# Patient Record
Sex: MALE | Race: WHITE | NOT HISPANIC OR LATINO
[De-identification: names, ages, dates, MRNs, and addresses within clinical notes are randomized per-mention and may not be internally consistent; named-entity substitution may affect disease eponyms.]

---

## 2019-04-12 PROBLEM — Z00.00 ENCOUNTER FOR PREVENTIVE HEALTH EXAMINATION: Status: ACTIVE | Noted: 2019-04-12

## 2019-04-15 ENCOUNTER — APPOINTMENT (OUTPATIENT)
Dept: COLORECTAL SURGERY | Facility: CLINIC | Age: 84
End: 2019-04-15
Payer: MEDICARE

## 2019-04-15 VITALS
RESPIRATION RATE: 14 BRPM | HEIGHT: 66 IN | BODY MASS INDEX: 22.5 KG/M2 | SYSTOLIC BLOOD PRESSURE: 120 MMHG | WEIGHT: 140 LBS | HEART RATE: 54 BPM | DIASTOLIC BLOOD PRESSURE: 66 MMHG

## 2019-04-15 DIAGNOSIS — Z86.79 PERSONAL HISTORY OF OTHER DISEASES OF THE CIRCULATORY SYSTEM: ICD-10-CM

## 2019-04-15 DIAGNOSIS — Z82.0 FAMILY HISTORY OF EPILEPSY AND OTHER DISEASES OF THE NERVOUS SYSTEM: ICD-10-CM

## 2019-04-15 DIAGNOSIS — Z86.39 PERSONAL HISTORY OF OTHER ENDOCRINE, NUTRITIONAL AND METABOLIC DISEASE: ICD-10-CM

## 2019-04-15 PROCEDURE — 99204 OFFICE O/P NEW MOD 45 MIN: CPT

## 2019-04-15 RX ORDER — METOPROLOL SUCCINATE 25 MG/1
25 TABLET, EXTENDED RELEASE ORAL
Refills: 0 | Status: ACTIVE | COMMUNITY

## 2019-04-15 RX ORDER — CHOLECALCIFEROL (VITAMIN D3) 25 MCG
TABLET ORAL
Refills: 0 | Status: ACTIVE | COMMUNITY

## 2019-04-15 RX ORDER — ASPIRIN 81 MG
81 TABLET, DELAYED RELEASE (ENTERIC COATED) ORAL
Refills: 0 | Status: ACTIVE | COMMUNITY

## 2019-04-15 RX ORDER — LISINOPRIL 20 MG/1
20 TABLET ORAL
Refills: 0 | Status: ACTIVE | COMMUNITY

## 2019-04-15 RX ORDER — SIMVASTATIN 20 MG/1
20 TABLET, FILM COATED ORAL
Refills: 0 | Status: ACTIVE | COMMUNITY

## 2019-04-18 RX ORDER — NEOMYCIN SULFATE 500 MG/1
500 TABLET ORAL
Qty: 6 | Refills: 0 | Status: ACTIVE | COMMUNITY
Start: 2019-04-18 | End: 1900-01-01

## 2019-04-18 RX ORDER — METRONIDAZOLE 500 MG/1
500 TABLET ORAL
Qty: 3 | Refills: 0 | Status: ACTIVE | COMMUNITY
Start: 2019-04-18 | End: 1900-01-01

## 2019-04-18 NOTE — ASSESSMENT
[FreeTextEntry1] : 83-year-old white gentleman who presents today for consultation regarding management of a possible partial large bowel obstruction.\par \par Many years ago in the early 2000's the patient underwent a sigmoid resection for a malignant polyp. He did well until a CAT scan revealed some thickening of the bladder in 2018. He eventually was prepped for a colonoscopy but a narrowing was noted at his prior anastomosis. This was dilated and the procedure was aborted. One month later the patient had a repeat sigmoidoscopy and a thickened area was noted. Biopsies were negative. The bowel was described as "wide open". Patient was then treated for C.diff. In March of 2019 he underwent a laparoscopic appendectomy but reportedly only part of the appendix was removed. He then developed some weight loss and change in bowel habits.  A PET scan was done revealing supposedly metastatic disease in the lung and liver and possibly some activity in the colon. He was sent to gastroenterology.  A virtual colonoscopy was performed revealing a narrowing at the prior sigmoid anastomosis with proximal dilation. Patient was sent here for evaluation.\par \par Additionally, it should be noted that the patient's lost 25-30 pounds. He describes multiple small bowel movements.\par \par It seems as if the patient has a metastatic disease. There is no biopsy proven recurrence in the colon. I would prefer to send this patient to interventional gastroenterologist for consideration of colonoscopic stent placement. He already has metastatic disease. I would attempt to avoid an operation on his colon as this will not change his overall outcome. If he can be stented, he can be placed back in the hands of oncology for systemic chemotherapy for palliative purposes.\par \par For now, I would have an attempt at stent placement made.

## 2019-04-18 NOTE — REVIEW OF SYSTEMS
[Loss Of Hearing] : hearing loss [As Noted in HPI] : as noted in HPI [Negative] : Psychiatric [Chest Pain] : no chest pain [SOB on Exertion] : no shortness of breath during exertion [Easy Bleeding] : no tendency for easy bleeding [Easy Bruising] : no tendency for easy bruising

## 2019-04-18 NOTE — PHYSICAL EXAM
[Normal Heart Sounds] : normal heart sounds [No Edema] : No edema [Normal Rate and Rhythm] : normal rate and rhythm [Oriented to Person] : oriented to person [Alert] : alert [Oriented to Place] : oriented to place [Oriented to Time] : oriented to time [Calm] : calm [de-identified] : flat +BS NT/ND [de-identified] : +ROM [de-identified] : NC/AT [de-identified] : intact

## 2019-04-18 NOTE — HISTORY OF PRESENT ILLNESS
[FreeTextEntry1] : 82yo WM underwent colon resection in 2001 for malignant sigmoid polyp.  Pt with 3yr surveillance colonoscopies since 2001 (last 2016).\par \par 2017 CT scan for abdominal pain revealed bladder wall thickening/inflammatory changes.\par Oct 2018 adm to [place] x5days with abdominal pain/difficulty swallowing. Upper endoscopy/flex sig.  Dxd with colitis/gastritis.  Txd with ABX.  Reportedly undergoes dilation of SC anastomosis.  Patient claims colon perforation occurred.\par Nov 6 flex sig for dilation purposes (not necessary as anastomosis was widely patent).  Bx performed. Path benign.\par Nov 28 admitted with Cdiff.  CT scan revealed pancolitis\par March 2019 lap appendectomy performed at [facility].  Reportedly CT scan revealed hepatic and pulmonary lesions.  Advised to f/u with Oncology.  F/U with Dr Krause.  Virtual Colonoscopy performed.\par \par Pt has experienced @25lb wt loss, @20 frequent small BMs daily, denies N/V or rectal bleeding\par

## 2019-04-19 ENCOUNTER — OUTPATIENT (OUTPATIENT)
Dept: OUTPATIENT SERVICES | Facility: HOSPITAL | Age: 84
LOS: 1 days | End: 2019-04-19
Payer: MEDICARE

## 2019-04-19 VITALS
RESPIRATION RATE: 16 BRPM | WEIGHT: 139.99 LBS | DIASTOLIC BLOOD PRESSURE: 80 MMHG | TEMPERATURE: 98 F | SYSTOLIC BLOOD PRESSURE: 127 MMHG | OXYGEN SATURATION: 100 % | HEIGHT: 66 IN | HEART RATE: 64 BPM

## 2019-04-19 DIAGNOSIS — C18.9 MALIGNANT NEOPLASM OF COLON, UNSPECIFIED: ICD-10-CM

## 2019-04-19 DIAGNOSIS — Z91.89 OTHER SPECIFIED PERSONAL RISK FACTORS, NOT ELSEWHERE CLASSIFIED: ICD-10-CM

## 2019-04-19 DIAGNOSIS — Z95.5 PRESENCE OF CORONARY ANGIOPLASTY IMPLANT AND GRAFT: ICD-10-CM

## 2019-04-19 DIAGNOSIS — Z98.890 OTHER SPECIFIED POSTPROCEDURAL STATES: Chronic | ICD-10-CM

## 2019-04-19 DIAGNOSIS — Z29.9 ENCOUNTER FOR PROPHYLACTIC MEASURES, UNSPECIFIED: ICD-10-CM

## 2019-04-19 DIAGNOSIS — Z95.5 PRESENCE OF CORONARY ANGIOPLASTY IMPLANT AND GRAFT: Chronic | ICD-10-CM

## 2019-04-19 DIAGNOSIS — Z98.49 CATARACT EXTRACTION STATUS, UNSPECIFIED EYE: Chronic | ICD-10-CM

## 2019-04-19 DIAGNOSIS — Z90.49 ACQUIRED ABSENCE OF OTHER SPECIFIED PARTS OF DIGESTIVE TRACT: Chronic | ICD-10-CM

## 2019-04-19 DIAGNOSIS — Z01.818 ENCOUNTER FOR OTHER PREPROCEDURAL EXAMINATION: ICD-10-CM

## 2019-04-19 LAB
ANION GAP SERPL CALC-SCNC: 13 MMOL/L — SIGNIFICANT CHANGE UP (ref 5–17)
BLD GP AB SCN SERPL QL: NEGATIVE — SIGNIFICANT CHANGE UP
BUN SERPL-MCNC: 20 MG/DL — SIGNIFICANT CHANGE UP (ref 7–23)
CALCIUM SERPL-MCNC: 10 MG/DL — SIGNIFICANT CHANGE UP (ref 8.4–10.5)
CHLORIDE SERPL-SCNC: 102 MMOL/L — SIGNIFICANT CHANGE UP (ref 96–108)
CO2 SERPL-SCNC: 21 MMOL/L — LOW (ref 22–31)
CREAT SERPL-MCNC: 1.02 MG/DL — SIGNIFICANT CHANGE UP (ref 0.5–1.3)
GLUCOSE SERPL-MCNC: 94 MG/DL — SIGNIFICANT CHANGE UP (ref 70–99)
POTASSIUM SERPL-MCNC: 4.6 MMOL/L — SIGNIFICANT CHANGE UP (ref 3.5–5.3)
POTASSIUM SERPL-SCNC: 4.6 MMOL/L — SIGNIFICANT CHANGE UP (ref 3.5–5.3)
RH IG SCN BLD-IMP: POSITIVE — SIGNIFICANT CHANGE UP
SODIUM SERPL-SCNC: 136 MMOL/L — SIGNIFICANT CHANGE UP (ref 135–145)

## 2019-04-19 PROCEDURE — 86900 BLOOD TYPING SEROLOGIC ABO: CPT

## 2019-04-19 PROCEDURE — 86901 BLOOD TYPING SEROLOGIC RH(D): CPT

## 2019-04-19 PROCEDURE — 85027 COMPLETE CBC AUTOMATED: CPT

## 2019-04-19 PROCEDURE — 86850 RBC ANTIBODY SCREEN: CPT

## 2019-04-19 PROCEDURE — 80048 BASIC METABOLIC PNL TOTAL CA: CPT

## 2019-04-19 PROCEDURE — 83036 HEMOGLOBIN GLYCOSYLATED A1C: CPT

## 2019-04-19 PROCEDURE — G0463: CPT

## 2019-04-19 PROCEDURE — 87086 URINE CULTURE/COLONY COUNT: CPT

## 2019-04-19 RX ORDER — SODIUM CHLORIDE 9 MG/ML
3 INJECTION INTRAMUSCULAR; INTRAVENOUS; SUBCUTANEOUS EVERY 8 HOURS
Qty: 0 | Refills: 0 | Status: DISCONTINUED | OUTPATIENT
Start: 2019-04-23 | End: 2019-04-23

## 2019-04-19 RX ORDER — LIDOCAINE HCL 20 MG/ML
0.2 VIAL (ML) INJECTION ONCE
Qty: 0 | Refills: 0 | Status: DISCONTINUED | OUTPATIENT
Start: 2019-04-23 | End: 2019-04-23

## 2019-04-19 RX ORDER — CEFOTETAN DISODIUM 1 G
2 VIAL (EA) INJECTION ONCE
Qty: 0 | Refills: 0 | Status: COMPLETED | OUTPATIENT
Start: 2019-04-23 | End: 2019-04-23

## 2019-04-19 RX ORDER — CHLORHEXIDINE GLUCONATE 213 G/1000ML
1 SOLUTION TOPICAL DAILY
Qty: 0 | Refills: 0 | Status: DISCONTINUED | OUTPATIENT
Start: 2019-04-23 | End: 2019-04-23

## 2019-04-19 NOTE — H&P PST ADULT - HISTORY OF PRESENT ILLNESS
82 yo male with h/o HTN, HLD, CAD (x 1 NAI to CX 11/14/2014) and history of colon resection (2001) for malignant sigmoid polyp. Since 10/2018 pt c/o abdominal pain, change in bowel habits and unintentional 25 lb weight loss. In October 2018, pt underwent upper endoscopy/flexible sigmoidoscopy at [place] and dilation of SC anastomosis, during which he states his bowel was perforated. Pt underwent another flexible sigmoidoscopy 11/6/2018 with biopsy (pathology benign). On 11/28/19, pt was admitted again for C Diff. In March 2019, laparoscopic appendectomy was performed (per pt, only part of the appendix was able to be removed). Imaging revealed lesion in lung and liver. Virtual colonoscopy recealed narrowing at the prior sigmoid anastomosis with proximal dilation. Pt is scheduled for Open Anterior Resection, Possible Ostomy and Cystoscopy Insertion of Ureteral Catheters on 4/23/2019.

## 2019-04-19 NOTE — H&P PST ADULT - ASSESSMENT
1.	Malignant Neoplasm of Colon  2.	Stented Coronary Artery  3.	At Risk for Sleep Apnea  CAPRINI VTE 2.0 SCORE [CLOT updated 2019]    AGE RELATED RISK FACTORS                                                       MOBILITY RELATED FACTORS  [ ] Age 41-60 years                                            (1 Point)                    [ ] Bed rest                                                        (1 Point)  [ ] Age: 61-74 years                                           (2 Points)                  [ ] Plaster cast                                                   (2 Points)  [ x] Age= 75 years                                              (3 Points)                    [ ] Bed bound for more than 72 hours                 (2 Points)    DISEASE RELATED RISK FACTORS                                               GENDER SPECIFIC FACTORS  [ ] Edema in the lower extremities                       (1 Point)              [ ] Pregnancy                                                     (1 Point)  [ ] Varicose veins                                               (1 Point)                     [ ] Post-partum < 6 weeks                                   (1 Point)             [ ] BMI > 25 Kg/m2                                            (1 Point)                     [ ] Hormonal therapy  or oral contraception          (1 Point)                 [ ] Sepsis (in the previous month)                        (1 Point)               [ ] History of pregnancy complications                 (1 point)  [ ] Pneumonia or serious lung disease                                               [ ] Unexplained or recurrent                     (1 Point)           (in the previous month)                               (1 Point)  [ ] Abnormal pulmonary function test                     (1 Point)                 SURGERY RELATED RISK FACTORS  [ ] Acute myocardial infarction                              (1 Point)               [ ]  Section                                             (1 Point)  [ ] Congestive heart failure (in the previous month)  (1 Point)      [ ] Minor surgery                                                  (1 Point)   [ ] Inflammatory bowel disease                             (1 Point)               [ ] Arthroscopic surgery                                        (2 Points)  [ ] Central venous access                                      (2 Points)                [ x] General surgery lasting more than 45 minutes (2 points)  [x ] Malignancy- Present or previous                   (2 Points)                [ ] Elective arthroplasty                                         (5 points)    [ ] Stroke (in the previous month)                          (5 Points)                                                                                                                                                           HEMATOLOGY RELATED FACTORS                                                 TRAUMA RELATED RISK FACTORS  [ ] Prior episodes of VTE                                     (3 Points)                [ ] Fracture of the hip, pelvis, or leg                       (5 Points)  [ ] Positive family history for VTE                         (3 Points)             [ ] Acute spinal cord injury (in the previous month)  (5 Points)  [ ] Prothrombin 01732 A                                     (3 Points)               [ ] Paralysis  (less than 1 month)                             (5 Points)  [ ] Factor V Leiden                                             (3 Points)                  [ ] Multiple Trauma within 1 month                        (5 Points)  [ ] Lupus anticoagulants                                     (3 Points)                                                           [ ] Anticardiolipin antibodies                               (3 Points)                                                       [ ] High homocysteine in the blood                      (3 Points)                                             [ ] Other congenital or acquired thrombophilia      (3 Points)                                                [ ] Heparin induced thrombocytopenia                  (3 Points)                                     Total Score [     7     ]

## 2019-04-19 NOTE — H&P PST ADULT - NSICDXPASTMEDICALHX_GEN_ALL_CORE_FT
PAST MEDICAL HISTORY:  Aortic stenosis     CAD (coronary artery disease)     Cancer of colon 2001 - malignant sigmoid polyp    HLD (hyperlipidemia)     Hypertension     Other specified diseases of appendix appendiceal lesion

## 2019-04-19 NOTE — H&P PST ADULT - NSICDXPASTSURGICALHX_GEN_ALL_CORE_FT
PAST SURGICAL HISTORY:  History of appendectomy 3/2019 - per pt, a portion of the appendix was not removed    History of colon resection 2001 - malignant sigmoid polyp    History of prostate surgery Greenlight laser of prostate    S/P cataract surgery bilateral    Stented coronary artery 11/14/2014 - NAI to CX

## 2019-04-19 NOTE — H&P PST ADULT - NSICDXPROBLEM_GEN_ALL_CORE_FT
PROBLEM DIAGNOSES  Problem: Malignant neoplasm of colon, unspecified  Assessment and Plan: Open Anterior Resection  Possible Ostomy  Cystoscopy Insertion of Ureteral Catheters  Stat FS on admission    Problem: Stented coronary artery  Assessment and Plan: Pt will continue aspirin to OR for stent protection    Problem: At risk for sleep apnea  Assessment and Plan: RAMILA Precautions  OR Booking notified    Problem: Need for prophylactic measure  Assessment and Plan: The Caprini score indicates that this patient is at high risk for a VTE event (score 6 or greater). Surgical patients in this group will benefit from both pharmacologic prophylaxis and intermittent compression devices.  The surgical team will determine the balance between VTE risk and bleeding risk, and other clinical considerations

## 2019-04-19 NOTE — H&P PST ADULT - NSANTHOSAYNRD_GEN_A_CORE
No. RAMILA screening performed.  STOP BANG Legend: 0-2 = LOW Risk; 3-4 = INTERMEDIATE Risk; 5-8 = HIGH Risk

## 2019-04-20 PROBLEM — C18.9 MALIGNANT NEOPLASM OF COLON, UNSPECIFIED: Chronic | Status: ACTIVE | Noted: 2019-04-19

## 2019-04-20 PROBLEM — K38.8 OTHER SPECIFIED DISEASES OF APPENDIX: Chronic | Status: ACTIVE | Noted: 2019-04-19

## 2019-04-20 LAB
CULTURE RESULTS: NO GROWTH — SIGNIFICANT CHANGE UP
HBA1C BLD-MCNC: 5.7 % — HIGH (ref 4–5.6)
HCT VFR BLD CALC: 42.9 % — SIGNIFICANT CHANGE UP (ref 39–50)
HGB BLD-MCNC: 13.4 G/DL — SIGNIFICANT CHANGE UP (ref 13–17)
MCHC RBC-ENTMCNC: 29.3 PG — SIGNIFICANT CHANGE UP (ref 27–34)
MCHC RBC-ENTMCNC: 31.2 GM/DL — LOW (ref 32–36)
MCV RBC AUTO: 93.9 FL — SIGNIFICANT CHANGE UP (ref 80–100)
PLATELET # BLD AUTO: 239 K/UL — SIGNIFICANT CHANGE UP (ref 150–400)
RBC # BLD: 4.57 M/UL — SIGNIFICANT CHANGE UP (ref 4.2–5.8)
RBC # FLD: 13.2 % — SIGNIFICANT CHANGE UP (ref 10.3–14.5)
SPECIMEN SOURCE: SIGNIFICANT CHANGE UP
WBC # BLD: 7.4 K/UL — SIGNIFICANT CHANGE UP (ref 3.8–10.5)
WBC # FLD AUTO: 7.4 K/UL — SIGNIFICANT CHANGE UP (ref 3.8–10.5)

## 2019-04-22 ENCOUNTER — TRANSCRIPTION ENCOUNTER (OUTPATIENT)
Age: 84
End: 2019-04-22

## 2019-04-23 ENCOUNTER — APPOINTMENT (OUTPATIENT)
Dept: COLORECTAL SURGERY | Facility: HOSPITAL | Age: 84
End: 2019-04-23

## 2019-04-23 ENCOUNTER — INPATIENT (INPATIENT)
Facility: HOSPITAL | Age: 84
LOS: 5 days | Discharge: HOME | DRG: 330 | End: 2019-04-29
Attending: SURGERY | Admitting: SURGERY
Payer: MEDICARE

## 2019-04-23 VITALS
RESPIRATION RATE: 18 BRPM | SYSTOLIC BLOOD PRESSURE: 154 MMHG | HEART RATE: 65 BPM | OXYGEN SATURATION: 100 % | TEMPERATURE: 98 F | WEIGHT: 81.13 LBS | HEIGHT: 66 IN | DIASTOLIC BLOOD PRESSURE: 79 MMHG

## 2019-04-23 DIAGNOSIS — I10 ESSENTIAL (PRIMARY) HYPERTENSION: ICD-10-CM

## 2019-04-23 DIAGNOSIS — Z98.890 OTHER SPECIFIED POSTPROCEDURAL STATES: Chronic | ICD-10-CM

## 2019-04-23 DIAGNOSIS — Z90.49 ACQUIRED ABSENCE OF OTHER SPECIFIED PARTS OF DIGESTIVE TRACT: Chronic | ICD-10-CM

## 2019-04-23 DIAGNOSIS — C18.9 MALIGNANT NEOPLASM OF COLON, UNSPECIFIED: ICD-10-CM

## 2019-04-23 DIAGNOSIS — Z01.818 ENCOUNTER FOR OTHER PREPROCEDURAL EXAMINATION: ICD-10-CM

## 2019-04-23 DIAGNOSIS — Z98.49 CATARACT EXTRACTION STATUS, UNSPECIFIED EYE: Chronic | ICD-10-CM

## 2019-04-23 DIAGNOSIS — I25.10 ATHEROSCLEROTIC HEART DISEASE OF NATIVE CORONARY ARTERY WITHOUT ANGINA PECTORIS: ICD-10-CM

## 2019-04-23 DIAGNOSIS — E78.5 HYPERLIPIDEMIA, UNSPECIFIED: ICD-10-CM

## 2019-04-23 DIAGNOSIS — Z95.5 PRESENCE OF CORONARY ANGIOPLASTY IMPLANT AND GRAFT: Chronic | ICD-10-CM

## 2019-04-23 LAB
GAS PNL BLDA: SIGNIFICANT CHANGE UP
GAS PNL BLDA: SIGNIFICANT CHANGE UP
GLUCOSE BLDC GLUCOMTR-MCNC: 118 MG/DL — HIGH (ref 70–99)
RH IG SCN BLD-IMP: POSITIVE — SIGNIFICANT CHANGE UP

## 2019-04-23 PROCEDURE — 44310 ILEOSTOMY/JEJUNOSTOMY: CPT

## 2019-04-23 PROCEDURE — 44310 ILEOSTOMY/JEJUNOSTOMY: CPT | Mod: 80

## 2019-04-23 RX ORDER — ACETAMINOPHEN 500 MG
650 TABLET ORAL ONCE
Qty: 0 | Refills: 0 | Status: COMPLETED | OUTPATIENT
Start: 2019-04-23 | End: 2019-04-24

## 2019-04-23 RX ORDER — ENOXAPARIN SODIUM 100 MG/ML
40 INJECTION SUBCUTANEOUS DAILY
Qty: 0 | Refills: 0 | Status: DISCONTINUED | OUTPATIENT
Start: 2019-04-23 | End: 2019-04-25

## 2019-04-23 RX ORDER — ACETAMINOPHEN 500 MG
650 TABLET ORAL ONCE
Qty: 0 | Refills: 0 | Status: COMPLETED | OUTPATIENT
Start: 2019-04-23 | End: 2019-04-23

## 2019-04-23 RX ORDER — L.ACIDOPH/B.ANIMALIS/B.LONGUM 15B CELL
1 CAPSULE ORAL
Qty: 0 | Refills: 0 | COMMUNITY

## 2019-04-23 RX ORDER — ACETAMINOPHEN 500 MG
1000 TABLET ORAL ONCE
Qty: 0 | Refills: 0 | Status: DISCONTINUED | OUTPATIENT
Start: 2019-04-23 | End: 2019-04-23

## 2019-04-23 RX ORDER — ASPIRIN/CALCIUM CARB/MAGNESIUM 324 MG
1 TABLET ORAL
Qty: 0 | Refills: 0 | COMMUNITY

## 2019-04-23 RX ORDER — ACETAMINOPHEN 500 MG
650 TABLET ORAL ONCE
Qty: 0 | Refills: 0 | Status: DISCONTINUED | OUTPATIENT
Start: 2019-04-23 | End: 2019-04-24

## 2019-04-23 RX ORDER — OXYCODONE HYDROCHLORIDE 5 MG/1
5 TABLET ORAL EVERY 4 HOURS
Qty: 0 | Refills: 0 | Status: DISCONTINUED | OUTPATIENT
Start: 2019-04-23 | End: 2019-04-23

## 2019-04-23 RX ORDER — HYDROMORPHONE HYDROCHLORIDE 2 MG/ML
0.25 INJECTION INTRAMUSCULAR; INTRAVENOUS; SUBCUTANEOUS
Qty: 0 | Refills: 0 | Status: DISCONTINUED | OUTPATIENT
Start: 2019-04-23 | End: 2019-04-23

## 2019-04-23 RX ORDER — KETOROLAC TROMETHAMINE 30 MG/ML
15 SYRINGE (ML) INJECTION EVERY 6 HOURS
Qty: 0 | Refills: 0 | Status: DISCONTINUED | OUTPATIENT
Start: 2019-04-23 | End: 2019-04-23

## 2019-04-23 RX ORDER — HYDROMORPHONE HYDROCHLORIDE 2 MG/ML
0.25 INJECTION INTRAMUSCULAR; INTRAVENOUS; SUBCUTANEOUS
Qty: 0 | Refills: 0 | Status: DISCONTINUED | OUTPATIENT
Start: 2019-04-23 | End: 2019-04-24

## 2019-04-23 RX ORDER — SIMVASTATIN 20 MG/1
1 TABLET, FILM COATED ORAL
Qty: 0 | Refills: 0 | COMMUNITY

## 2019-04-23 RX ORDER — KETOROLAC TROMETHAMINE 30 MG/ML
7.5 SYRINGE (ML) INJECTION EVERY 6 HOURS
Qty: 0 | Refills: 0 | Status: DISCONTINUED | OUTPATIENT
Start: 2019-04-23 | End: 2019-04-24

## 2019-04-23 RX ORDER — HYDROMORPHONE HYDROCHLORIDE 2 MG/ML
0.5 INJECTION INTRAMUSCULAR; INTRAVENOUS; SUBCUTANEOUS
Qty: 0 | Refills: 0 | Status: DISCONTINUED | OUTPATIENT
Start: 2019-04-23 | End: 2019-04-24

## 2019-04-23 RX ORDER — KETOROLAC TROMETHAMINE 30 MG/ML
7.5 SYRINGE (ML) INJECTION EVERY 6 HOURS
Qty: 0 | Refills: 0 | Status: DISCONTINUED | OUTPATIENT
Start: 2019-04-23 | End: 2019-04-23

## 2019-04-23 RX ORDER — MAGNESIUM CHLORIDE
500 CRYSTALS MISCELLANEOUS
Qty: 0 | Refills: 0 | COMMUNITY

## 2019-04-23 RX ORDER — DOXEPIN HCL 100 MG
10 CAPSULE ORAL AT BEDTIME
Qty: 0 | Refills: 0 | Status: DISCONTINUED | OUTPATIENT
Start: 2019-04-23 | End: 2019-04-25

## 2019-04-23 RX ORDER — DEXTROSE MONOHYDRATE, SODIUM CHLORIDE, AND POTASSIUM CHLORIDE 50; .745; 4.5 G/1000ML; G/1000ML; G/1000ML
1000 INJECTION, SOLUTION INTRAVENOUS
Qty: 0 | Refills: 0 | Status: DISCONTINUED | OUTPATIENT
Start: 2019-04-23 | End: 2019-04-24

## 2019-04-23 RX ORDER — CHOLECALCIFEROL (VITAMIN D3) 125 MCG
1 CAPSULE ORAL
Qty: 0 | Refills: 0 | COMMUNITY

## 2019-04-23 RX ORDER — ASPIRIN/CALCIUM CARB/MAGNESIUM 324 MG
81 TABLET ORAL DAILY
Qty: 0 | Refills: 0 | Status: DISCONTINUED | OUTPATIENT
Start: 2019-04-24 | End: 2019-04-25

## 2019-04-23 RX ORDER — METOPROLOL TARTRATE 50 MG
5 TABLET ORAL EVERY 6 HOURS
Qty: 0 | Refills: 0 | Status: DISCONTINUED | OUTPATIENT
Start: 2019-04-23 | End: 2019-04-24

## 2019-04-23 RX ORDER — LISINOPRIL 2.5 MG/1
1 TABLET ORAL
Qty: 0 | Refills: 0 | COMMUNITY

## 2019-04-23 RX ADMIN — Medication 10 MILLIGRAM(S): at 22:02

## 2019-04-23 RX ADMIN — Medication 260 MILLIGRAM(S): at 18:49

## 2019-04-23 RX ADMIN — HYDROMORPHONE HYDROCHLORIDE 0.25 MILLIGRAM(S): 2 INJECTION INTRAMUSCULAR; INTRAVENOUS; SUBCUTANEOUS at 14:25

## 2019-04-23 RX ADMIN — HYDROMORPHONE HYDROCHLORIDE 0.25 MILLIGRAM(S): 2 INJECTION INTRAMUSCULAR; INTRAVENOUS; SUBCUTANEOUS at 14:45

## 2019-04-23 RX ADMIN — ENOXAPARIN SODIUM 40 MILLIGRAM(S): 100 INJECTION SUBCUTANEOUS at 21:22

## 2019-04-23 RX ADMIN — HYDROMORPHONE HYDROCHLORIDE 0.25 MILLIGRAM(S): 2 INJECTION INTRAMUSCULAR; INTRAVENOUS; SUBCUTANEOUS at 21:36

## 2019-04-23 RX ADMIN — HYDROMORPHONE HYDROCHLORIDE 0.25 MILLIGRAM(S): 2 INJECTION INTRAMUSCULAR; INTRAVENOUS; SUBCUTANEOUS at 14:15

## 2019-04-23 RX ADMIN — HYDROMORPHONE HYDROCHLORIDE 0.25 MILLIGRAM(S): 2 INJECTION INTRAMUSCULAR; INTRAVENOUS; SUBCUTANEOUS at 17:05

## 2019-04-23 RX ADMIN — Medication 5 MILLIGRAM(S): at 17:36

## 2019-04-23 RX ADMIN — HYDROMORPHONE HYDROCHLORIDE 0.25 MILLIGRAM(S): 2 INJECTION INTRAMUSCULAR; INTRAVENOUS; SUBCUTANEOUS at 17:15

## 2019-04-23 RX ADMIN — Medication 7.5 MILLIGRAM(S): at 19:17

## 2019-04-23 RX ADMIN — DEXTROSE MONOHYDRATE, SODIUM CHLORIDE, AND POTASSIUM CHLORIDE 50 MILLILITER(S): 50; .745; 4.5 INJECTION, SOLUTION INTRAVENOUS at 15:00

## 2019-04-23 RX ADMIN — Medication 650 MILLIGRAM(S): at 19:17

## 2019-04-23 RX ADMIN — HYDROMORPHONE HYDROCHLORIDE 0.25 MILLIGRAM(S): 2 INJECTION INTRAMUSCULAR; INTRAVENOUS; SUBCUTANEOUS at 15:45

## 2019-04-23 RX ADMIN — Medication 7.5 MILLIGRAM(S): at 18:51

## 2019-04-23 RX ADMIN — HYDROMORPHONE HYDROCHLORIDE 0.25 MILLIGRAM(S): 2 INJECTION INTRAMUSCULAR; INTRAVENOUS; SUBCUTANEOUS at 21:21

## 2019-04-23 NOTE — BRIEF OPERATIVE NOTE - NSICDXBRIEFPROCEDURE_GEN_ALL_CORE_FT
PROCEDURES:  Sigmoidoscopy 23-Apr-2019 14:59:16  Eusebio Barber  Exploratory laparotomy 23-Apr-2019 14:59:03  Eusebio Barber  Ileostomy, loop, open 23-Apr-2019 14:58:53  Eusebio Barber

## 2019-04-23 NOTE — PROGRESS NOTE ADULT - SUBJECTIVE AND OBJECTIVE BOX
Subjective: Patient is a 83y old  Male who presents with a chief complaint of "I have a blockage in my colon."pt with hx ca colon many years ago colon perforation several months ago along with partial appendectomy now seen by me yesterday found to have complete obstruction at sigmoid level  with liver mets   infection probably neoplasm here for surgery today also with gerd hyperlipidemia hypertension  insomnia   PAST MEDICAL & SURGICAL HISTORY:  Aortic stenosis  Other specified diseases of appendix: appendiceal lesion  Cancer of colon: 2001 - malignant sigmoid polyp  CAD (coronary artery disease)  HLD (hyperlipidemia)  Hypertension  S/P cataract surgery: bilateral  History of prostate surgery: Greenlight laser of prostate  History of colon resection: 2001 - malignant sigmoid polyp  History of appendectomy: 3/2019 - per pt, a portion of the appendix was not removed  Stented coronary artery: 11/14/2014 - NAI to CX      Allergies    No Known Allergies    Intolerances    oxycodone (Other)  OxyContin (Other)      MEDICATIONS  (STANDING):  cefoTEtan  IVPB 2 Gram(s) IV Intermittent once    MEDICATIONS  (PRN):      CONSTITUTIONAL: No fever,  50lbweight loss, or fatigue  EYES: No eye pain, visual disturbances, or discharge  ENMT:  No difficulty hearing, tinnitus, vertigo; No sinus or throat pain  NECK: No pain or stiffness  BREASTS: No pain, masses, or nipple discharge  RESPIRATORY: No cough, wheezing, chills or hemoptysis; No shortness of breath  CARDIOVASCULAR: No chest pain, palpitations, dizziness, or leg swelling  GASTROINTESTINAL: No abdominal or epigastric pain. No nausea, vomiting, or hematemesis; No diarrhea or constipation. No melena or hematochezia.  GENITOURINARY: No dysuria, frequency, hematuria, or incontinence  NEUROLOGICAL: No headaches, memory loss, loss of strength, numbness, or tremors  SKIN: No itching, burning, rashes, or lesions   LYMPH NODES: No enlarged glands  ENDOCRINE: No heat or cold intolerance; No hair loss  MUSCULOSKELETAL: No joint pain or swelling; No muscle, back, or extremity pain  PSYCHIATRIC: No depression, anxiety, mood swings, or difficulty sleeping  HEME/LYMPH: No easy bruising, or bleeding gums  ALLERY AND IMMUNOLOGIC: No hives or eczema      PHYSICAL EXAM:    GENERAL: Comfortable, no acute distress   HEAD:  Normocephalic, atraumatic  EYES: EOMI, PERRLA  HEENT: Moist mucous membranes  NECK: Supple, No JVD  NERVOUS SYSTEM:  Alert & Oriented X3, Motor Strength 5/5 B/L upper and lower extremities  CHEST/LUNG: Clear to auscultation bilaterally  HEART: Regular rate and rhythm  ABDOMEN: Soft, Nontender, distended, Bowel sounds present  GENITOURINARY: Voiding, no palpable bladder  EXTREMITIES:   No clubbing, cyanosis, or edema  MUSCULOSKELTAL- No muscle tenderness, no joint tenderness  SKIN-no rash            Vital Signs Last 24 Hrs  T(C): 36.8 (23 Apr 2019 07:12), Max: 36.8 (23 Apr 2019 07:12)  T(F): 98.2 (23 Apr 2019 07:12), Max: 98.2 (23 Apr 2019 07:12)  HR: 65 (23 Apr 2019 07:12) (65 - 65)  BP: 154/79 (23 Apr 2019 07:12) (154/79 - 154/79)  BP(mean): --  RR: 18 (23 Apr 2019 07:12) (18 - 18)  SpO2: 100% (23 Apr 2019 07:12) (100% - 100%)      LABS:                      EKG:    Imaging Studies: obstructing mass on virtual colonoscopy and colonscopy       Impression / Plan:

## 2019-04-23 NOTE — PROGRESS NOTE ADULT - ASSESSMENT
pt with high bop hyperlipidemia with colon cancer with mets and obstructing lesion sigmoid colon for surgery today

## 2019-04-23 NOTE — BRIEF OPERATIVE NOTE - NSICDXBRIEFPREOP_GEN_ALL_CORE_FT
PRE-OP DIAGNOSIS:  Metastatic colon cancer to liver 23-Apr-2019 15:01:59  Eusebio Barber  Large bowel obstruction 23-Apr-2019 15:01:39  Eusebio Barber  Recurrent carcinoma of colon 23-Apr-2019 15:01:25  Eusebio Barber

## 2019-04-23 NOTE — BRIEF OPERATIVE NOTE - NSICDXBRIEFPOSTOP_GEN_ALL_CORE_FT
POST-OP DIAGNOSIS:  Large bowel obstruction 23-Apr-2019 15:02:43  Eusebio Barber  Metastatic colon cancer to liver 23-Apr-2019 15:02:31  Eusebio Barber  Recurrent carcinoma of colon 23-Apr-2019 15:02:23  Eusebio Barber

## 2019-04-23 NOTE — CHART NOTE - NSCHARTNOTEFT_GEN_A_CORE
SURGERY POST-OP NOTE:    S: Patient underwent and tolerated procedure without issue and sent to PACU. Patient denies chest pain, shortness of breath, nausea, vomiting, lightheadedness, or dizziness. Pain was well controlled.      O:  T(C): 36.4 (04-23-19 @ 17:15), Max: 36.8 (04-23-19 @ 14:00)  HR: 97 (04-23-19 @ 18:00) (70 - 105)  BP: 108/58 (04-23-19 @ 18:00) (96/59 - 138/68)  RR: 16 (04-23-19 @ 18:00) (12 - 18)  SpO2: 97% (04-23-19 @ 18:00) (92% - 100%)           Gen: NAD, resting in bed, alert and responding appropriately  Resp: Non-labored respirations  Abd: Soft, nontender, nondistended, midline incision dressed w/ some serosang strikethrough otherwise d/i, ostomy viable no gas/stool  : UCath and Clayton in place      Assessment/Plan:  83y Male now POD#0 s/p Sigmoidoscopy w/ rectal tube placement, exploratory laparotomy, loop ileostomy recovering appropriately    - Pain control  - NPO / IVF  - Clayton / UCath  - DVT ppx: Lovenox, OOBA  - Incentive spirometry    Dispo: floors    JERROD Batista, PGY-1  [facility]  [id number] with any questions

## 2019-04-23 NOTE — BRIEF OPERATIVE NOTE - OPERATION/FINDINGS
Bilateral u-caths placed pre-operatively.  Laparotomy incision made, small bowel and cecum - which were adherent in pelvis - were freed with combination of sharp and blunt dissection as well as TA stapler. Disease was palpated in rectum and was noted to extend posteriorly and to the right, involving the cecum which was adherent to anterior abdominal wall. We were able to palpate both of the U-caths and the ureters were well away from our dissection. The diseased part of rectum was noted to be closely and extensively adherent to the bladder.  At this point we made the decision to consult GI to attempt endoscopic stent placement across the rectal mass. This was accomplished with copious amount of stool passing through. We matured a loop ileostomy on the left side of patient because of the adherent cecum on the right. Fascia closed with figure of eight stitches. Skin stapled. Ileostomy brooked.

## 2019-04-24 LAB
ANION GAP SERPL CALC-SCNC: 7 MMOL/L — SIGNIFICANT CHANGE UP (ref 5–17)
ANION GAP SERPL CALC-SCNC: 9 MMOL/L — SIGNIFICANT CHANGE UP (ref 5–17)
BUN SERPL-MCNC: 24 MG/DL — HIGH (ref 7–23)
BUN SERPL-MCNC: 29 MG/DL — HIGH (ref 7–23)
CALCIUM SERPL-MCNC: 8.5 MG/DL — SIGNIFICANT CHANGE UP (ref 8.4–10.5)
CALCIUM SERPL-MCNC: 8.6 MG/DL — SIGNIFICANT CHANGE UP (ref 8.4–10.5)
CHLORIDE SERPL-SCNC: 101 MMOL/L — SIGNIFICANT CHANGE UP (ref 96–108)
CHLORIDE SERPL-SCNC: 102 MMOL/L — SIGNIFICANT CHANGE UP (ref 96–108)
CO2 SERPL-SCNC: 21 MMOL/L — LOW (ref 22–31)
CO2 SERPL-SCNC: 24 MMOL/L — SIGNIFICANT CHANGE UP (ref 22–31)
CREAT SERPL-MCNC: 1.25 MG/DL — SIGNIFICANT CHANGE UP (ref 0.5–1.3)
CREAT SERPL-MCNC: 1.43 MG/DL — HIGH (ref 0.5–1.3)
GLUCOSE SERPL-MCNC: 147 MG/DL — HIGH (ref 70–99)
GLUCOSE SERPL-MCNC: 153 MG/DL — HIGH (ref 70–99)
HCT VFR BLD CALC: 36 % — LOW (ref 39–50)
HCT VFR BLD CALC: 37.6 % — LOW (ref 39–50)
HGB BLD-MCNC: 12 G/DL — LOW (ref 13–17)
HGB BLD-MCNC: 12.7 G/DL — LOW (ref 13–17)
MAGNESIUM SERPL-MCNC: 2.2 MG/DL — SIGNIFICANT CHANGE UP (ref 1.6–2.6)
MAGNESIUM SERPL-MCNC: 2.3 MG/DL — SIGNIFICANT CHANGE UP (ref 1.6–2.6)
MCHC RBC-ENTMCNC: 29.3 PG — SIGNIFICANT CHANGE UP (ref 27–34)
MCHC RBC-ENTMCNC: 31.9 GM/DL — LOW (ref 32–36)
MCHC RBC-ENTMCNC: 32.6 PG — SIGNIFICANT CHANGE UP (ref 27–34)
MCHC RBC-ENTMCNC: 35.1 GM/DL — SIGNIFICANT CHANGE UP (ref 32–36)
MCV RBC AUTO: 91.9 FL — SIGNIFICANT CHANGE UP (ref 80–100)
MCV RBC AUTO: 92.8 FL — SIGNIFICANT CHANGE UP (ref 80–100)
PHOSPHATE SERPL-MCNC: 2.1 MG/DL — LOW (ref 2.5–4.5)
PHOSPHATE SERPL-MCNC: 2.6 MG/DL — SIGNIFICANT CHANGE UP (ref 2.5–4.5)
PLATELET # BLD AUTO: 222 K/UL — SIGNIFICANT CHANGE UP (ref 150–400)
PLATELET # BLD AUTO: 226 K/UL — SIGNIFICANT CHANGE UP (ref 150–400)
POTASSIUM SERPL-MCNC: 4.6 MMOL/L — SIGNIFICANT CHANGE UP (ref 3.5–5.3)
POTASSIUM SERPL-MCNC: 5.2 MMOL/L — SIGNIFICANT CHANGE UP (ref 3.5–5.3)
POTASSIUM SERPL-SCNC: 4.6 MMOL/L — SIGNIFICANT CHANGE UP (ref 3.5–5.3)
POTASSIUM SERPL-SCNC: 5.2 MMOL/L — SIGNIFICANT CHANGE UP (ref 3.5–5.3)
RBC # BLD: 3.88 M/UL — LOW (ref 4.2–5.8)
RBC # BLD: 4.09 M/UL — LOW (ref 4.2–5.8)
RBC # FLD: 12.8 % — SIGNIFICANT CHANGE UP (ref 10.3–14.5)
RBC # FLD: 13.5 % — SIGNIFICANT CHANGE UP (ref 10.3–14.5)
SODIUM SERPL-SCNC: 131 MMOL/L — LOW (ref 135–145)
SODIUM SERPL-SCNC: 133 MMOL/L — LOW (ref 135–145)
WBC # BLD: 11.65 K/UL — HIGH (ref 3.8–10.5)
WBC # BLD: 19.2 K/UL — HIGH (ref 3.8–10.5)
WBC # FLD AUTO: 11.65 K/UL — HIGH (ref 3.8–10.5)
WBC # FLD AUTO: 19.2 K/UL — HIGH (ref 3.8–10.5)

## 2019-04-24 PROCEDURE — 99222 1ST HOSP IP/OBS MODERATE 55: CPT | Mod: GC

## 2019-04-24 PROCEDURE — 93010 ELECTROCARDIOGRAM REPORT: CPT

## 2019-04-24 RX ORDER — ONDANSETRON 8 MG/1
4 TABLET, FILM COATED ORAL ONCE
Qty: 0 | Refills: 0 | Status: COMPLETED | OUTPATIENT
Start: 2019-04-24 | End: 2019-04-24

## 2019-04-24 RX ORDER — ENOXAPARIN SODIUM 100 MG/ML
40 INJECTION SUBCUTANEOUS
Qty: 1200 | Refills: 0 | OUTPATIENT
Start: 2019-04-24 | End: 2019-05-23

## 2019-04-24 RX ORDER — BENZOCAINE AND MENTHOL 5; 1 G/100ML; G/100ML
1 LIQUID ORAL ONCE
Qty: 0 | Refills: 0 | Status: COMPLETED | OUTPATIENT
Start: 2019-04-24 | End: 2019-04-24

## 2019-04-24 RX ORDER — ACETAMINOPHEN 500 MG
650 TABLET ORAL EVERY 6 HOURS
Qty: 0 | Refills: 0 | Status: DISCONTINUED | OUTPATIENT
Start: 2019-04-24 | End: 2019-04-29

## 2019-04-24 RX ORDER — TAMSULOSIN HYDROCHLORIDE 0.4 MG/1
0.4 CAPSULE ORAL AT BEDTIME
Qty: 0 | Refills: 0 | Status: DISCONTINUED | OUTPATIENT
Start: 2019-04-24 | End: 2019-04-29

## 2019-04-24 RX ORDER — SODIUM CHLORIDE 9 MG/ML
1000 INJECTION, SOLUTION INTRAVENOUS
Qty: 0 | Refills: 0 | Status: DISCONTINUED | OUTPATIENT
Start: 2019-04-24 | End: 2019-04-26

## 2019-04-24 RX ORDER — HYDROMORPHONE HYDROCHLORIDE 2 MG/ML
2 INJECTION INTRAMUSCULAR; INTRAVENOUS; SUBCUTANEOUS EVERY 4 HOURS
Qty: 0 | Refills: 0 | Status: DISCONTINUED | OUTPATIENT
Start: 2019-04-24 | End: 2019-04-24

## 2019-04-24 RX ORDER — TETRACAINE/BENZOCAINE/BUTAMBEN 2%-14%-2%
1 OINTMENT (GRAM) TOPICAL THREE TIMES A DAY
Qty: 0 | Refills: 0 | Status: DISCONTINUED | OUTPATIENT
Start: 2019-04-24 | End: 2019-04-26

## 2019-04-24 RX ORDER — CHOLECALCIFEROL (VITAMIN D3) 125 MCG
1000 CAPSULE ORAL DAILY
Qty: 0 | Refills: 0 | Status: DISCONTINUED | OUTPATIENT
Start: 2019-04-24 | End: 2019-04-29

## 2019-04-24 RX ORDER — SODIUM CHLORIDE 9 MG/ML
500 INJECTION, SOLUTION INTRAVENOUS ONCE
Qty: 0 | Refills: 0 | Status: COMPLETED | OUTPATIENT
Start: 2019-04-24 | End: 2019-04-24

## 2019-04-24 RX ORDER — PANTOPRAZOLE SODIUM 20 MG/1
40 TABLET, DELAYED RELEASE ORAL DAILY
Qty: 0 | Refills: 0 | Status: DISCONTINUED | OUTPATIENT
Start: 2019-04-24 | End: 2019-04-25

## 2019-04-24 RX ORDER — HYDROMORPHONE HYDROCHLORIDE 2 MG/ML
1 INJECTION INTRAMUSCULAR; INTRAVENOUS; SUBCUTANEOUS EVERY 4 HOURS
Qty: 0 | Refills: 0 | Status: DISCONTINUED | OUTPATIENT
Start: 2019-04-24 | End: 2019-04-24

## 2019-04-24 RX ORDER — BENZOCAINE AND MENTHOL 5; 1 G/100ML; G/100ML
1 LIQUID ORAL EVERY 6 HOURS
Qty: 0 | Refills: 0 | Status: DISCONTINUED | OUTPATIENT
Start: 2019-04-24 | End: 2019-04-29

## 2019-04-24 RX ORDER — SODIUM CHLORIDE 9 MG/ML
1000 INJECTION, SOLUTION INTRAVENOUS
Qty: 0 | Refills: 0 | Status: DISCONTINUED | OUTPATIENT
Start: 2019-04-24 | End: 2019-04-24

## 2019-04-24 RX ORDER — HYDROMORPHONE HYDROCHLORIDE 2 MG/ML
4 INJECTION INTRAMUSCULAR; INTRAVENOUS; SUBCUTANEOUS EVERY 4 HOURS
Qty: 0 | Refills: 0 | Status: DISCONTINUED | OUTPATIENT
Start: 2019-04-24 | End: 2019-04-29

## 2019-04-24 RX ORDER — METOPROLOL TARTRATE 50 MG
25 TABLET ORAL DAILY
Qty: 0 | Refills: 0 | Status: DISCONTINUED | OUTPATIENT
Start: 2019-04-24 | End: 2019-04-25

## 2019-04-24 RX ORDER — HYDROMORPHONE HYDROCHLORIDE 2 MG/ML
2 INJECTION INTRAMUSCULAR; INTRAVENOUS; SUBCUTANEOUS EVERY 4 HOURS
Qty: 0 | Refills: 0 | Status: DISCONTINUED | OUTPATIENT
Start: 2019-04-24 | End: 2019-04-29

## 2019-04-24 RX ORDER — SIMVASTATIN 20 MG/1
20 TABLET, FILM COATED ORAL AT BEDTIME
Qty: 0 | Refills: 0 | Status: DISCONTINUED | OUTPATIENT
Start: 2019-04-24 | End: 2019-04-29

## 2019-04-24 RX ADMIN — BENZOCAINE AND MENTHOL 1 LOZENGE: 5; 1 LIQUID ORAL at 17:18

## 2019-04-24 RX ADMIN — DEXTROSE MONOHYDRATE, SODIUM CHLORIDE, AND POTASSIUM CHLORIDE 50 MILLILITER(S): 50; .745; 4.5 INJECTION, SOLUTION INTRAVENOUS at 06:10

## 2019-04-24 RX ADMIN — Medication 1 SPRAY(S): at 12:37

## 2019-04-24 RX ADMIN — ONDANSETRON 4 MILLIGRAM(S): 8 TABLET, FILM COATED ORAL at 08:10

## 2019-04-24 RX ADMIN — ENOXAPARIN SODIUM 40 MILLIGRAM(S): 100 INJECTION SUBCUTANEOUS at 12:36

## 2019-04-24 RX ADMIN — HYDROMORPHONE HYDROCHLORIDE 0.5 MILLIGRAM(S): 2 INJECTION INTRAMUSCULAR; INTRAVENOUS; SUBCUTANEOUS at 01:18

## 2019-04-24 RX ADMIN — Medication 7.5 MILLIGRAM(S): at 12:37

## 2019-04-24 RX ADMIN — Medication 7.5 MILLIGRAM(S): at 00:30

## 2019-04-24 RX ADMIN — BENZOCAINE AND MENTHOL 1 LOZENGE: 5; 1 LIQUID ORAL at 08:10

## 2019-04-24 RX ADMIN — Medication 650 MILLIGRAM(S): at 17:40

## 2019-04-24 RX ADMIN — Medication 650 MILLIGRAM(S): at 06:30

## 2019-04-24 RX ADMIN — Medication 7.5 MILLIGRAM(S): at 06:11

## 2019-04-24 RX ADMIN — Medication 10 MILLIGRAM(S): at 21:39

## 2019-04-24 RX ADMIN — SODIUM CHLORIDE 250 MILLILITER(S): 9 INJECTION, SOLUTION INTRAVENOUS at 01:02

## 2019-04-24 RX ADMIN — Medication 81 MILLIGRAM(S): at 12:36

## 2019-04-24 RX ADMIN — Medication 62.5 MILLIMOLE(S): at 09:43

## 2019-04-24 RX ADMIN — Medication 7.5 MILLIGRAM(S): at 06:30

## 2019-04-24 RX ADMIN — DEXTROSE MONOHYDRATE, SODIUM CHLORIDE, AND POTASSIUM CHLORIDE 50 MILLILITER(S): 50; .745; 4.5 INJECTION, SOLUTION INTRAVENOUS at 09:43

## 2019-04-24 RX ADMIN — SODIUM CHLORIDE 75 MILLILITER(S): 9 INJECTION, SOLUTION INTRAVENOUS at 21:40

## 2019-04-24 RX ADMIN — Medication 7.5 MILLIGRAM(S): at 00:11

## 2019-04-24 RX ADMIN — Medication 650 MILLIGRAM(S): at 00:30

## 2019-04-24 RX ADMIN — Medication 260 MILLIGRAM(S): at 00:12

## 2019-04-24 RX ADMIN — Medication 260 MILLIGRAM(S): at 06:11

## 2019-04-24 RX ADMIN — BENZOCAINE AND MENTHOL 1 LOZENGE: 5; 1 LIQUID ORAL at 10:40

## 2019-04-24 RX ADMIN — SIMVASTATIN 20 MILLIGRAM(S): 20 TABLET, FILM COATED ORAL at 21:39

## 2019-04-24 RX ADMIN — Medication 650 MILLIGRAM(S): at 17:10

## 2019-04-24 RX ADMIN — Medication 1000 UNIT(S): at 14:42

## 2019-04-24 RX ADMIN — PANTOPRAZOLE SODIUM 40 MILLIGRAM(S): 20 TABLET, DELAYED RELEASE ORAL at 12:37

## 2019-04-24 RX ADMIN — HYDROMORPHONE HYDROCHLORIDE 0.5 MILLIGRAM(S): 2 INJECTION INTRAMUSCULAR; INTRAVENOUS; SUBCUTANEOUS at 01:03

## 2019-04-24 RX ADMIN — SODIUM CHLORIDE 75 MILLILITER(S): 9 INJECTION, SOLUTION INTRAVENOUS at 16:46

## 2019-04-24 RX ADMIN — TAMSULOSIN HYDROCHLORIDE 0.4 MILLIGRAM(S): 0.4 CAPSULE ORAL at 21:39

## 2019-04-24 RX ADMIN — Medication 7.5 MILLIGRAM(S): at 13:07

## 2019-04-24 NOTE — PROVIDER CONTACT NOTE (OTHER) - ACTION/TREATMENT ORDERED:
MD Hansen notified and aware. Ordered for additional 500cc LR bolus to increase urine output. Will administer additional pain medication to help relieve pain. Will continue to monitor.

## 2019-04-24 NOTE — PROVIDER CONTACT NOTE (OTHER) - ASSESSMENT
Clayton remains in place w/remaining L ucath. Urine remains blood tinged. Pt c/o discomfort @ lower abd (pt claims to be bladder discomfort). Bladder scan shows 0cc of residual urine.

## 2019-04-24 NOTE — PROGRESS NOTE ADULT - ASSESSMENT
pt with high bop hyperlipidemia with colon cancer with mets and obstructing lesion sigmoid colon day 1 post op illeostomy and stent placement

## 2019-04-24 NOTE — PROGRESS NOTE ADULT - ASSESSMENT
83M hx colon cancer s/p resection (2001) who had a recurrence and is now POD#1 s/p b/l ucath placement, ex-lap, intraoperative rectal stent placement by GI, loop ileostomy creation (unresectable mass) (4/23)    - Pain control  - NPO / IVF  - d/c UCath, Clayton, f/u TOV  - Resume ASA  - DVT ppx: Lovenox, OOBA    Dispo: anil Batista, PGY-1  [facility]  [id number] with any questions 83M hx colon cancer s/p resection (2001) who had a recurrence and is now POD#1 s/p b/l ucath placement, ex-lap, intraoperative rectal stent placement by GI, loop ileostomy creation (unresectable mass) (4/23)    - Pain control  - NPO / IVF  - d/c UCath, keep Clayton until Friday 4/26  - Resume ASA  - DVT ppx: Lovenox, OOBA    Dispo: anil Batista, PGY-1  [facility]  [id number] with any questions

## 2019-04-24 NOTE — PROGRESS NOTE ADULT - SUBJECTIVE AND OBJECTIVE BOX
Subjective: Patient is a 83y old  Male who presents with a chief complaint of "I have a blockage in my colon."pt with hx ca colon many years ago colon perforation several months ago along with partial appendectomy now found to have complete obstruction at sigmoid level  with liver mets  Day 1 post op illeostomy stent placement     PSH  Aortic stenosis  Other specified diseases of appendix: appendiceal lesion  Cancer of colon: 2001 - malignant sigmoid polyp  CAD (coronary artery disease)  HLD (hyperlipidemia)  Hypertension  S/P cataract surgery: bilateral  History of prostate surgery: Greenlight laser of prostate  History of colon resection: 2001 - malignant sigmoid polyp  History of appendectomy: 3/2019 - per pt, a portion of the appendix was not removed  Stented coronary artery: 11/14/2014 - NAI to CX      Allergies    No Known Allergies    Intolerances    oxycodone (Other)  OxyContin (Other)      MEDICATIONS  (STANDING):  cefoTEtan  IVPB 2 Gram(s) IV Intermittent once    MEDICATIONS  (PRN):      CONSTITUTIONAL: No fever,  50lbweight loss, fatigue  EYES: No eye pain, visual disturbances, or discharge  ENMT:  No difficulty hearing, tinnitus, vertigo; No sinus or throat pain  NECK: No pain or stiffness  BREASTS: No pain, masses, or nipple discharge  RESPIRATORY: No cough, wheezing, chills or hemoptysis; No shortness of breath  CARDIOVASCULAR: No chest pain, palpitations, dizziness, or leg swelling  GASTROINTESTINAL:abdominal pain distension no bms GENITOURINARY: No dysuria, frequency, hematuria, or incontinence  NEUROLOGICAL: No headaches, memory loss, loss of strength, numbness, or tremors  SKIN: No itching, burning, rashes, or lesions   LYMPH NODES: No enlarged glands  ENDOCRINE: No heat or cold intolerance; No hair loss  MUSCULOSKELETAL: No joint pain or swelling; No muscle, back, or extremity pain  PSYCHIATRIC: No depression, anxiety, mood swings, or difficulty sleeping  HEME/LYMPH: No easy bruising, or bleeding gums  ALLERY AND IMMUNOLOGIC: No hives or eczema      PHYSICAL EXAM:    GENERAL: Comfortable, no acute distress   HEAD:  Normocephalic, atraumatic  EYES: EOMI, PERRLA  HEENT: Moist mucous membranes  NECK: Supple, No JVD  NERVOUS SYSTEM:  Alert & Oriented X3, Motor Strength 5/5 B/L upper and lower extremities  CHEST/LUNG: Clear to auscultation bilaterally  HEART: Regular rate and rhythm  ABDOMEN distended dressed illeostomy  GENITOURINARY: indwelling tony   EXTREMITIES:   No clubbing, cyanosis, or edema  MUSCULOSKELTAL- No muscle tenderness, no joint tenderness  SKIN-no rash            Vital Signs Last 24 Hrs  T(C): 36.8 (23 Apr 2019 07:12), Max: 36.8 (23 Apr 2019 07:12)  T(F): 98.2 (23 Apr 2019 07:12), Max: 98.2 (23 Apr 2019 07:12)  HR: 65 (23 Apr 2019 07:12) (65 - 65)  BP: 154/79 (23 Apr 2019 07:12) (154/79 - 154/79)  BP(mean): --  RR: 18 (23 Apr 2019 07:12) (18 - 18)  SpO2: 100% (23 Apr 2019 07:12) (100% - 100%)      LABS:  pending this am                     EKG:    Imaging Studies: obstructing mass on virtual colonoscopy and colonscopy       Impression / Plan:

## 2019-04-24 NOTE — PROGRESS NOTE ADULT - SUBJECTIVE AND OBJECTIVE BOX
COLORECTAL SURGERY DAILY PROGRESS NOTE:    Interval:  No acute events overnight.    Subjective:  Patient seen and examined. Reports pain is well controlled. Denies N/V. No ostomy function.    Vital Signs Last 24 Hrs  T(C): 36.7 (24 Apr 2019 05:17), Max: 37.1 (23 Apr 2019 19:11)  T(F): 98 (24 Apr 2019 05:17), Max: 98.8 (23 Apr 2019 19:11)  HR: 106 (24 Apr 2019 05:17) (65 - 106)  BP: 106/68 (24 Apr 2019 05:17) (96/59 - 154/79)  BP(mean): 80 (23 Apr 2019 18:45) (71 - 95)  RR: 18 (24 Apr 2019 05:17) (12 - 18)  SpO2: 95% (24 Apr 2019 05:17) (92% - 100%)    Exam:  Gen: NAD, resting in bed, alert and responding appropriately  Resp: Airway patent, non-labored respirations  Abd: Soft, ND, NT, incision dressed d/i, ostomy viable w/out fxn  Ext: WWP      I&O's Detail    23 Apr 2019 07:01  -  24 Apr 2019 05:20  --------------------------------------------------------  IN:    dextrose 5% + sodium chloride 0.45% with potassium chloride 20 mEq/L: 250 mL    Lactated Ringers IV Bolus: 500 mL    Solution: 130 mL  Total IN: 880 mL    OUT:    Ileostomy: 15 mL    Indwelling Catheter - Urethral: 385 mL  Total OUT: 400 mL    Total NET: 480 mL      Daily Height in cm: 167.64 (23 Apr 2019 07:12)    Daily     MEDICATIONS  (STANDING):  acetaminophen  IVPB .. 650 milliGRAM(s) IV Intermittent once  acetaminophen  IVPB .. 650 milliGRAM(s) IV Intermittent once  acetaminophen  IVPB .. 650 milliGRAM(s) IV Intermittent once  aspirin  chewable 81 milliGRAM(s) Oral daily  dextrose 5% + sodium chloride 0.45% with potassium chloride 20 mEq/L 1000 milliLiter(s) (50 mL/Hr) IV Continuous <Continuous>  doxepin 10 milliGRAM(s) Oral at bedtime  enoxaparin Injectable 40 milliGRAM(s) SubCutaneous daily  ketorolac   Injectable 7.5 milliGRAM(s) IV Push every 6 hours  metoprolol tartrate Injectable 5 milliGRAM(s) IV Push every 6 hours    MEDICATIONS  (PRN):  HYDROmorphone  Injectable 0.5 milliGRAM(s) IV Push every 3 hours PRN Severe Pain (7 - 10)  HYDROmorphone  Injectable 0.25 milliGRAM(s) IV Push every 3 hours PRN Moderate Pain (4 - 6) COLORECTAL SURGERY DAILY PROGRESS NOTE:    Interval:  No acute events overnight.    Subjective:  Patient seen and examined. Reports pain is well controlled. Denies N/V. No ostomy function.    Vital Signs Last 24 Hrs  T(C): 36.7 (24 Apr 2019 05:17), Max: 37.1 (23 Apr 2019 19:11)  T(F): 98 (24 Apr 2019 05:17), Max: 98.8 (23 Apr 2019 19:11)  HR: 106 (24 Apr 2019 05:17) (65 - 106)  BP: 106/68 (24 Apr 2019 05:17) (96/59 - 154/79)  BP(mean): 80 (23 Apr 2019 18:45) (71 - 95)  RR: 18 (24 Apr 2019 05:17) (12 - 18)  SpO2: 95% (24 Apr 2019 05:17) (92% - 100%)    Exam:  Gen: NAD, resting in bed, alert and responding appropriately  Resp: Airway patent, non-labored respirations  Abd: Soft, ND, NT, incision dressed d/i, ostomy viable w/ minimal output  Ext: WWP      I&O's Detail    23 Apr 2019 07:01  -  24 Apr 2019 05:20  --------------------------------------------------------  IN:    dextrose 5% + sodium chloride 0.45% with potassium chloride 20 mEq/L: 250 mL    Lactated Ringers IV Bolus: 500 mL    Solution: 130 mL  Total IN: 880 mL    OUT:    Ileostomy: 15 mL    Indwelling Catheter - Urethral: 385 mL  Total OUT: 400 mL    Total NET: 480 mL      Daily Height in cm: 167.64 (23 Apr 2019 07:12)    Daily     MEDICATIONS  (STANDING):  acetaminophen  IVPB .. 650 milliGRAM(s) IV Intermittent once  acetaminophen  IVPB .. 650 milliGRAM(s) IV Intermittent once  acetaminophen  IVPB .. 650 milliGRAM(s) IV Intermittent once  aspirin  chewable 81 milliGRAM(s) Oral daily  dextrose 5% + sodium chloride 0.45% with potassium chloride 20 mEq/L 1000 milliLiter(s) (50 mL/Hr) IV Continuous <Continuous>  doxepin 10 milliGRAM(s) Oral at bedtime  enoxaparin Injectable 40 milliGRAM(s) SubCutaneous daily  ketorolac   Injectable 7.5 milliGRAM(s) IV Push every 6 hours  metoprolol tartrate Injectable 5 milliGRAM(s) IV Push every 6 hours    MEDICATIONS  (PRN):  HYDROmorphone  Injectable 0.5 milliGRAM(s) IV Push every 3 hours PRN Severe Pain (7 - 10)  HYDROmorphone  Injectable 0.25 milliGRAM(s) IV Push every 3 hours PRN Moderate Pain (4 - 6)

## 2019-04-24 NOTE — PROVIDER CONTACT NOTE (OTHER) - ACTION/TREATMENT ORDERED:
MD notified and aware. Will pass on to day team and reevaluate. Will continue to monitor and maintain safety.

## 2019-04-25 LAB
ANION GAP SERPL CALC-SCNC: 11 MMOL/L — SIGNIFICANT CHANGE UP (ref 5–17)
ANION GAP SERPL CALC-SCNC: 11 MMOL/L — SIGNIFICANT CHANGE UP (ref 5–17)
ANION GAP SERPL CALC-SCNC: 9 MMOL/L — SIGNIFICANT CHANGE UP (ref 5–17)
ANION GAP SERPL CALC-SCNC: 9 MMOL/L — SIGNIFICANT CHANGE UP (ref 5–17)
BUN SERPL-MCNC: 20 MG/DL — SIGNIFICANT CHANGE UP (ref 7–23)
BUN SERPL-MCNC: 23 MG/DL — SIGNIFICANT CHANGE UP (ref 7–23)
CALCIUM SERPL-MCNC: 8.8 MG/DL — SIGNIFICANT CHANGE UP (ref 8.4–10.5)
CALCIUM SERPL-MCNC: 8.8 MG/DL — SIGNIFICANT CHANGE UP (ref 8.4–10.5)
CALCIUM SERPL-MCNC: 8.9 MG/DL — SIGNIFICANT CHANGE UP (ref 8.4–10.5)
CALCIUM SERPL-MCNC: 8.9 MG/DL — SIGNIFICANT CHANGE UP (ref 8.4–10.5)
CHLORIDE SERPL-SCNC: 103 MMOL/L — SIGNIFICANT CHANGE UP (ref 96–108)
CHLORIDE SERPL-SCNC: 104 MMOL/L — SIGNIFICANT CHANGE UP (ref 96–108)
CK MB BLD-MCNC: 1 % — SIGNIFICANT CHANGE UP (ref 0–3.5)
CK MB CFR SERPL CALC: 1.5 NG/ML — SIGNIFICANT CHANGE UP (ref 0–6.7)
CK SERPL-CCNC: 148 U/L — SIGNIFICANT CHANGE UP (ref 30–200)
CO2 SERPL-SCNC: 19 MMOL/L — LOW (ref 22–31)
CO2 SERPL-SCNC: 20 MMOL/L — LOW (ref 22–31)
CO2 SERPL-SCNC: 21 MMOL/L — LOW (ref 22–31)
CO2 SERPL-SCNC: 22 MMOL/L — SIGNIFICANT CHANGE UP (ref 22–31)
CREAT SERPL-MCNC: 0.95 MG/DL — SIGNIFICANT CHANGE UP (ref 0.5–1.3)
CREAT SERPL-MCNC: 0.97 MG/DL — SIGNIFICANT CHANGE UP (ref 0.5–1.3)
CREAT SERPL-MCNC: 1.01 MG/DL — SIGNIFICANT CHANGE UP (ref 0.5–1.3)
CREAT SERPL-MCNC: 1.09 MG/DL — SIGNIFICANT CHANGE UP (ref 0.5–1.3)
GLUCOSE SERPL-MCNC: 147 MG/DL — HIGH (ref 70–99)
GLUCOSE SERPL-MCNC: 150 MG/DL — HIGH (ref 70–99)
GLUCOSE SERPL-MCNC: 157 MG/DL — HIGH (ref 70–99)
GLUCOSE SERPL-MCNC: 188 MG/DL — HIGH (ref 70–99)
HCT VFR BLD CALC: 32.2 % — LOW (ref 39–50)
HCT VFR BLD CALC: 32.4 % — LOW (ref 39–50)
HCT VFR BLD CALC: 33.7 % — LOW (ref 39–50)
HGB BLD-MCNC: 10.4 G/DL — LOW (ref 13–17)
HGB BLD-MCNC: 10.7 G/DL — LOW (ref 13–17)
HGB BLD-MCNC: 10.7 G/DL — LOW (ref 13–17)
LACTATE SERPL-SCNC: 1.8 MMOL/L — SIGNIFICANT CHANGE UP (ref 0.7–2)
MAGNESIUM SERPL-MCNC: 2.1 MG/DL — SIGNIFICANT CHANGE UP (ref 1.6–2.6)
MAGNESIUM SERPL-MCNC: 2.1 MG/DL — SIGNIFICANT CHANGE UP (ref 1.6–2.6)
MAGNESIUM SERPL-MCNC: 2.2 MG/DL — SIGNIFICANT CHANGE UP (ref 1.6–2.6)
MCHC RBC-ENTMCNC: 29.6 PG — SIGNIFICANT CHANGE UP (ref 27–34)
MCHC RBC-ENTMCNC: 29.7 PG — SIGNIFICANT CHANGE UP (ref 27–34)
MCHC RBC-ENTMCNC: 31.1 PG — SIGNIFICANT CHANGE UP (ref 27–34)
MCHC RBC-ENTMCNC: 31.8 GM/DL — LOW (ref 32–36)
MCHC RBC-ENTMCNC: 32 GM/DL — SIGNIFICANT CHANGE UP (ref 32–36)
MCHC RBC-ENTMCNC: 33.3 GM/DL — SIGNIFICANT CHANGE UP (ref 32–36)
MCV RBC AUTO: 92.6 FL — SIGNIFICANT CHANGE UP (ref 80–100)
MCV RBC AUTO: 93.3 FL — SIGNIFICANT CHANGE UP (ref 80–100)
MCV RBC AUTO: 93.6 FL — SIGNIFICANT CHANGE UP (ref 80–100)
PHOSPHATE SERPL-MCNC: 1.4 MG/DL — LOW (ref 2.5–4.5)
PHOSPHATE SERPL-MCNC: 2.8 MG/DL — SIGNIFICANT CHANGE UP (ref 2.5–4.5)
PHOSPHATE SERPL-MCNC: 2.9 MG/DL — SIGNIFICANT CHANGE UP (ref 2.5–4.5)
PLATELET # BLD AUTO: 192 K/UL — SIGNIFICANT CHANGE UP (ref 150–400)
PLATELET # BLD AUTO: 205 K/UL — SIGNIFICANT CHANGE UP (ref 150–400)
PLATELET # BLD AUTO: 205 K/UL — SIGNIFICANT CHANGE UP (ref 150–400)
POTASSIUM SERPL-MCNC: 4.4 MMOL/L — SIGNIFICANT CHANGE UP (ref 3.5–5.3)
POTASSIUM SERPL-MCNC: 4.4 MMOL/L — SIGNIFICANT CHANGE UP (ref 3.5–5.3)
POTASSIUM SERPL-MCNC: 4.6 MMOL/L — SIGNIFICANT CHANGE UP (ref 3.5–5.3)
POTASSIUM SERPL-MCNC: 4.6 MMOL/L — SIGNIFICANT CHANGE UP (ref 3.5–5.3)
POTASSIUM SERPL-SCNC: 4.4 MMOL/L — SIGNIFICANT CHANGE UP (ref 3.5–5.3)
POTASSIUM SERPL-SCNC: 4.4 MMOL/L — SIGNIFICANT CHANGE UP (ref 3.5–5.3)
POTASSIUM SERPL-SCNC: 4.6 MMOL/L — SIGNIFICANT CHANGE UP (ref 3.5–5.3)
POTASSIUM SERPL-SCNC: 4.6 MMOL/L — SIGNIFICANT CHANGE UP (ref 3.5–5.3)
RBC # BLD: 3.45 M/UL — LOW (ref 4.2–5.8)
RBC # BLD: 3.5 M/UL — LOW (ref 4.2–5.8)
RBC # BLD: 3.6 M/UL — LOW (ref 4.2–5.8)
RBC # FLD: 12.8 % — SIGNIFICANT CHANGE UP (ref 10.3–14.5)
RBC # FLD: 13 % — SIGNIFICANT CHANGE UP (ref 10.3–14.5)
RBC # FLD: 13.6 % — SIGNIFICANT CHANGE UP (ref 10.3–14.5)
SODIUM SERPL-SCNC: 132 MMOL/L — LOW (ref 135–145)
SODIUM SERPL-SCNC: 135 MMOL/L — SIGNIFICANT CHANGE UP (ref 135–145)
TROPONIN T, HIGH SENSITIVITY RESULT: 27 NG/L — SIGNIFICANT CHANGE UP (ref 0–51)
WBC # BLD: 13.2 K/UL — HIGH (ref 3.8–10.5)
WBC # BLD: 16.7 K/UL — HIGH (ref 3.8–10.5)
WBC # BLD: 17.82 K/UL — HIGH (ref 3.8–10.5)
WBC # FLD AUTO: 13.2 K/UL — HIGH (ref 3.8–10.5)
WBC # FLD AUTO: 16.7 K/UL — HIGH (ref 3.8–10.5)
WBC # FLD AUTO: 17.82 K/UL — HIGH (ref 3.8–10.5)

## 2019-04-25 PROCEDURE — 93010 ELECTROCARDIOGRAM REPORT: CPT

## 2019-04-25 PROCEDURE — 99232 SBSQ HOSP IP/OBS MODERATE 35: CPT | Mod: GC

## 2019-04-25 PROCEDURE — 71045 X-RAY EXAM CHEST 1 VIEW: CPT | Mod: 26

## 2019-04-25 RX ORDER — DOXEPIN HCL 100 MG
20 CAPSULE ORAL AT BEDTIME
Qty: 0 | Refills: 0 | Status: DISCONTINUED | OUTPATIENT
Start: 2019-04-25 | End: 2019-04-29

## 2019-04-25 RX ORDER — ENOXAPARIN SODIUM 100 MG/ML
40 INJECTION SUBCUTANEOUS DAILY
Qty: 0 | Refills: 0 | Status: DISCONTINUED | OUTPATIENT
Start: 2019-04-25 | End: 2019-04-29

## 2019-04-25 RX ORDER — SODIUM CHLORIDE 9 MG/ML
1000 INJECTION, SOLUTION INTRAVENOUS ONCE
Qty: 0 | Refills: 0 | Status: COMPLETED | OUTPATIENT
Start: 2019-04-25 | End: 2019-04-25

## 2019-04-25 RX ORDER — PANTOPRAZOLE SODIUM 20 MG/1
40 TABLET, DELAYED RELEASE ORAL
Qty: 0 | Refills: 0 | Status: DISCONTINUED | OUTPATIENT
Start: 2019-04-25 | End: 2019-04-26

## 2019-04-25 RX ORDER — METOPROLOL TARTRATE 50 MG
5 TABLET ORAL ONCE
Qty: 0 | Refills: 0 | Status: COMPLETED | OUTPATIENT
Start: 2019-04-25 | End: 2019-04-25

## 2019-04-25 RX ORDER — METOPROLOL TARTRATE 50 MG
50 TABLET ORAL DAILY
Qty: 0 | Refills: 0 | Status: DISCONTINUED | OUTPATIENT
Start: 2019-04-25 | End: 2019-04-29

## 2019-04-25 RX ORDER — SODIUM CHLORIDE 9 MG/ML
1000 INJECTION INTRAMUSCULAR; INTRAVENOUS; SUBCUTANEOUS ONCE
Qty: 0 | Refills: 0 | Status: COMPLETED | OUTPATIENT
Start: 2019-04-25 | End: 2019-04-25

## 2019-04-25 RX ADMIN — Medication 20 MILLIGRAM(S): at 21:18

## 2019-04-25 RX ADMIN — Medication 650 MILLIGRAM(S): at 19:00

## 2019-04-25 RX ADMIN — Medication 5 MILLIGRAM(S): at 09:21

## 2019-04-25 RX ADMIN — Medication 1000 UNIT(S): at 12:11

## 2019-04-25 RX ADMIN — Medication 650 MILLIGRAM(S): at 17:41

## 2019-04-25 RX ADMIN — Medication 62.5 MILLIMOLE(S): at 21:22

## 2019-04-25 RX ADMIN — Medication 650 MILLIGRAM(S): at 06:48

## 2019-04-25 RX ADMIN — SODIUM CHLORIDE 1000 MILLILITER(S): 9 INJECTION INTRAMUSCULAR; INTRAVENOUS; SUBCUTANEOUS at 06:21

## 2019-04-25 RX ADMIN — ENOXAPARIN SODIUM 40 MILLIGRAM(S): 100 INJECTION SUBCUTANEOUS at 12:10

## 2019-04-25 RX ADMIN — Medication 650 MILLIGRAM(S): at 12:11

## 2019-04-25 RX ADMIN — PANTOPRAZOLE SODIUM 40 MILLIGRAM(S): 20 TABLET, DELAYED RELEASE ORAL at 17:41

## 2019-04-25 RX ADMIN — Medication 650 MILLIGRAM(S): at 06:19

## 2019-04-25 RX ADMIN — Medication 650 MILLIGRAM(S): at 12:45

## 2019-04-25 RX ADMIN — SODIUM CHLORIDE 75 MILLILITER(S): 9 INJECTION, SOLUTION INTRAVENOUS at 11:00

## 2019-04-25 RX ADMIN — Medication 25 MILLIGRAM(S): at 06:19

## 2019-04-25 RX ADMIN — TAMSULOSIN HYDROCHLORIDE 0.4 MILLIGRAM(S): 0.4 CAPSULE ORAL at 21:18

## 2019-04-25 RX ADMIN — Medication 85 MILLIMOLE(S): at 07:17

## 2019-04-25 RX ADMIN — SIMVASTATIN 20 MILLIGRAM(S): 20 TABLET, FILM COATED ORAL at 21:18

## 2019-04-25 RX ADMIN — SODIUM CHLORIDE 1000 MILLILITER(S): 9 INJECTION, SOLUTION INTRAVENOUS at 11:00

## 2019-04-25 NOTE — PHYSICAL THERAPY INITIAL EVALUATION ADULT - LEVEL OF CONSCIOUSNESS, REHAB EVAL
PT assessment complete. Agree with triage note. Pt c/o abd pain since Sunday. No n/v/d. Pt last BM yesterday. Pt dx with flu. PT in gown. Educated on NPO status until cleared by MD. Pt is alert, active, age appropriate, and NAD. No needs. Will continue to monitor.     lethargic/somnolent

## 2019-04-25 NOTE — PHYSICAL THERAPY INITIAL EVALUATION ADULT - PERTINENT HX OF CURRENT PROBLEM, REHAB EVAL
83y old  Male who presents with a chief complaint of "I have a blockage in my colon."pt with hx ca colon many years ago colon perforation several months ago along with partial appendectomy now found to have complete obstruction at sigmoid level  with liver mets 83y old  Male who presents with a chief complaint of "I have a blockage in my colon."pt with hx ca colon many years ago colon perforation several months ago along with partial appendectomy now found to have complete obstruction at sigmoid level with liver mets

## 2019-04-25 NOTE — PROGRESS NOTE ADULT - SUBJECTIVE AND OBJECTIVE BOX
Subjective: Patient is a 83y old  Male who presents with a chief complaint of "I have a blockage in my colon."pt with hx ca colon many years ago colon perforation several months ago along with partial appendectomy now found to have complete obstruction at sigmoid level  with liver mets  Day 2 post op illeostomy stent placement     PSH  Aortic stenosis  Other specified diseases of appendix: appendiceal lesion  Cancer of colon: 2001 - malignant sigmoid polyp  CAD (coronary artery disease)  HLD (hyperlipidemia)  Hypertension  S/P cataract surgery: bilateral  History of prostate surgery: Greenlight laser of prostate  History of colon resection: 2001 - malignant sigmoid polyp  History of appendectomy: 3/2019 - per pt, a portion of the appendix was not removed  Stented coronary artery: 11/14/2014 - NAI to CX      Allergies    No Known Allergies    Intolerances    oxycodone (Other)  OxyContin (Other)      MEDICATIONS  (STANDING):  cefoTEtan  IVPB 2 Gram(s) IV Intermittent once    MEDICATIONS  (PRN):      CONSTITUTIONAL: No fever,  50lbweight loss, fatigue  EYES: No eye pain, visual disturbances, or discharge  ENMT:  No difficulty hearing, tinnitus, vertigo; No sinus or throat pain  NECK: No pain or stiffness  BREASTS: No pain, masses, or nipple discharge  RESPIRATORY: No cough, wheezing, chills or hemoptysis; No shortness of breath  CARDIOVASCULAR: No chest pain, palpitations, dizziness, or leg swelling  GASTROINTESTINAL:abdominal pain distension no bms GENITOURINARY: No dysuria, frequency, hematuria, or incontinence  NEUROLOGICAL: No headaches, memory loss, loss of strength, numbness, or tremors  SKIN: No itching, burning, rashes, or lesions   LYMPH NODES: No enlarged glands  ENDOCRINE: No heat or cold intolerance; No hair loss  MUSCULOSKELETAL: No joint pain or swelling; No muscle, back, or extremity pain  PSYCHIATRIC: No depression, anxiety, mood swings, or difficulty sleeping  HEME/LYMPH: No easy bruising, or bleeding gums  ALLERY AND IMMUNOLOGIC: No hives or eczema      PHYSICAL EXAM:    GENERAL: Comfortable, no acute distress   HEAD:  Normocephalic, atraumatic  EYES: EOMI, PERRLA  HEENT: Moist mucous membranes  NECK: Supple, No JVD  NERVOUS SYSTEM:  Alert & Oriented X3, Motor Strength 5/5 B/L upper and lower extremities  CHEST/LUNG: Clear to auscultation bilaterally  HEART: Regular rate and rhythm  ABDOMEN distended dressed illeostomy  stent with drainage   rectum GENITOURINARY: indwelling tony   EXTREMITIES:   No clubbing, cyanosis, or edema  MUSCULOSKELTAL- No muscle tenderness, no joint tenderness  SKIN-no rash  vs stable afebrile               Basic Metabolic Panel - STAT (04.24.19 @ 19:00)    Sodium, Serum: 131 mmol/L    Potassium, Serum: 4.6 mmol/L    Chloride, Serum: 101 mmol/L    Carbon Dioxide, Serum: 21 mmol/L    Anion Gap, Serum: 9 mmol/L    Blood Urea Nitrogen, Serum: 29 mg/dL    Creatinine, Serum: 1.25 mg/dL    Glucose, Serum: 153 mg/dL    Calcium, Total Serum: 8.6 mg/dL    eGFR if Non : 53: Interpretative comment  The units for eGFR are mL/min/1.73M2 (normalized body surface area). The  eGFR is calculated from a serum creatinine using the CKD-EPI equation.  Other variables required for calculation are race, age and sex. Among  patients with chronic kidney disease (CKD), the eGFR is useful in  determining the stage of disease according to KDOQI CKD classification.  All eGFR results are reported numerically with the following  interpretation.          GFR                    With                 Without     (ml/min/1.73 m2)    Kidney Damage       Kidney Damage        >= 90                    Stage 1                     Normal        60-89                    Stage 2                     Decreased GFR        30-59     Stage 3                     Stage 3        15-29                    Stage 4                     Stage 4        < 15                      Stage 5                     Stage 5  Each stage of CKD assumes that the associated GFR level has been in  effect for at least 3 months. Determination of stages one and two (with  eGFR > 59 ml/min/m2) requires estimation of kidney damage for at least 3  months as defined by structural or functional abnormalities.  Limitations: All estimates of GFR will be less accurate for patients at  extremes of muscle mass (including but not limited to frail elderly,  critically ill, or cancer patients), those with unusual diets, and those  with conditions associated with reduced secretion or extrarenal  elimination of creatinine. The eGFR equation is not recommended for use  in patients with unstable creatinine levels. mL/min/1.73M2    eGFR if African American: 61 mL/min/1.73M2    Complete Blood Count (04.24.19 @ 19:00)    WBC Count: 19.2 K/uL    RBC Count: 3.88 M/uL    Hemoglobin: 12.7 g/dL    Hematocrit: 36.0 %    Mean Cell Volume: 92.8 fl    Mean Cell Hemoglobin: 32.6 pg    Mean Cell Hemoglobin Conc: 35.1 gm/dL    Red Cell Distrib Width: 12.8 %    Platelet Count - Automated: 222 K/uL                    EKG:    Imaging Studies: obstructing mass on virtual colonoscopy and colonscopy       Impression / Plan:

## 2019-04-25 NOTE — PROGRESS NOTE ADULT - ASSESSMENT
83M hx colon cancer s/p resection (2001) who had a recurrence and is now POD#2 s/p b/l ucath placement, ex-lap, intraoperative rectal stent placement by GI, loop ileostomy creation (unresectable mass) (4/23)    - Pain control  - Monitor HR (EKG 4/24 sinus tachycardia)  - Clears, possibly advance to LRD, ASA  - Keep [place] until Friday 4/26  - DVT ppx: Lovenox, AMANDA    Dispo: anil Batista, PGY-1  [facility]  [id number] with any questions

## 2019-04-25 NOTE — PHYSICAL THERAPY INITIAL EVALUATION ADULT - DISCHARGE DISPOSITION, PT EVAL
home/no skilled PT needs/Home with no skilled PT needs. No AD recommendation. Supervision with mobility skills at this time (pt's spouse states she can provide). **Pt cleared for d/c home from PT perspective at this time.

## 2019-04-25 NOTE — PHYSICAL THERAPY INITIAL EVALUATION ADULT - GENERAL OBSERVATIONS, REHAB EVAL
Pt received seated in chair, (+) ileostomy to tony bag, tony, R UE IV, NAD. Pt's spouse at side. Pt lethargic, reports poor sleep in hospital, agreeable to PT eval.

## 2019-04-25 NOTE — PROGRESS NOTE ADULT - SUBJECTIVE AND OBJECTIVE BOX
Chief Complaint:  Patient is a 83y old  Male who presents with a chief complaint of colon ca stent illeostomy metast ca (2019 04:24)      Interval Events:   Patient c/o heartburn this morning. Relieved with Protonix yesterday. HR elevated, made NPO by surgical team. Patient with output from rectum +flatu +stool.     Hospital Medications:  acetaminophen   Tablet .. 650 milliGRAM(s) Oral every 6 hours  aspirin  chewable 81 milliGRAM(s) Oral daily  benzocaine 15 mG/menthol 3.6 mG Lozenge 1 Lozenge Oral every 6 hours PRN  cholecalciferol 1000 Unit(s) Oral daily  dextrose 5% + sodium chloride 0.9%. 1000 milliLiter(s) IV Continuous <Continuous>  doxepin 20 milliGRAM(s) Oral at bedtime  enoxaparin Injectable 40 milliGRAM(s) SubCutaneous daily  HYDROmorphone   Tablet 2 milliGRAM(s) Oral every 4 hours PRN  HYDROmorphone   Tablet 4 milliGRAM(s) Oral every 4 hours PRN  metoprolol succinate ER 25 milliGRAM(s) Oral daily  metoprolol tartrate Injectable 5 milliGRAM(s) IV Push once  pantoprazole  Injectable 40 milliGRAM(s) IV Push two times a day  simvastatin 20 milliGRAM(s) Oral at bedtime  tamsulosin 0.4 milliGRAM(s) Oral at bedtime  tetracaine/benzocaine/butamben Spray 1 Spray(s) Topical three times a day PRN        PHYSICAL EXAM:   Vital Signs:  Vital Signs Last 24 Hrs  T(C): 36.9 (2019 08:26), Max: 38.1 (2019 00:38)  T(F): 98.5 (2019 08:26), Max: 100.5 (2019 00:38)  HR: 134 (2019 08:26) (99 - 134)  BP: 114/67 (2019 08:26) (104/58 - 124/77)  BP(mean): --  RR: 18 (2019 08:26) (18 - 18)  SpO2: 95% (2019 08:26) (94% - 97%)  Daily     Daily Weight in k.3 (2019 09:11)    GENERAL:  Appears stated age,  no distress  HEENT:   sclera -anicteric  CHEST:   no increased effort, breath sounds clear  HEART:  Regular rhythm, S1, S2,   ABDOMEN:  Soft, non-tender, non-distended, normoactive bowel sounds, +ostomy    EXTEREMITIES:  no cyanosis,clubbing or edema  SKIN:  No rash/no jaundice   NEURO:  Alert, oriented    LABS:                        12.7   19.2  )-----------( 222      ( 2019 19:00 )             36.0     Mean Cell Volume: 92.8 fl (19 @ 19:00)        135  |  104  |  23  ----------------------------<  157<H>  4.6   |  22  |  1.09    Ca    8.8      2019 07:13  Phos  1.4       Mg     2.2                                         12.7   19.2  )-----------( 222      ( 2019 19:00 )             36.0                         12.0   11.65 )-----------( 226      ( 2019 08:45 )             37.6     Imaging:  no new abdominal imaging

## 2019-04-25 NOTE — PROGRESS NOTE ADULT - PROBLEM SELECTOR PLAN 1
day 2 post op colon resection illeostomy stent placement dvt prophylaxis surg protocol followed by gi for stent check next week

## 2019-04-25 NOTE — CHART NOTE - NSCHARTNOTEFT_GEN_A_CORE
spoke with Dr. Krause- advised him patients heart rate up to 130s today- missed lopressor several doses IV spoke with Dr. Krause per Dr. Fletcher regarding patient's heart rate- advised him rate up to 130s today- all sinus tachycardia- lopressor doses IV held for dosing parameter yesterday- received 25mg po metoprolol today and additional 5mg IV-  increase po to 50mg XL daily- he will have Dr. Parham cards see patient    Jammie Mendoza PA-C [id number]

## 2019-04-25 NOTE — PROGRESS NOTE ADULT - ASSESSMENT
pt with high bop hyperlipidemia with colon cancer with mets and obstructing lesion sigmoid colon day 2 post op illeostomy and stent placement  oob to chair

## 2019-04-25 NOTE — PROGRESS NOTE ADULT - ASSESSMENT
Impression:  1)Sigmoid obstruction- suspect secondary to recurrent colorectal malignancy, liver and lung mets are seen on imaging, s/p loop ileostomy with colonic stent placement.  2)Tachycardia with leukocytosis- concern for sepsis    Recommendations:  -monitor GI function through rectum and ileostomy  -IVF hydration, supportive care as per surgical team  -infectious workup   -will discuss with Dr. Cleary, potential repeat endoscopic evaluation next week     Please call with questions  Ena Lambert  GI Fellow  Pager: [phone] Impression:  1)Sigmoid obstruction- suspect secondary to recurrent colorectal malignancy, liver and lung mets are seen on imaging, s/p loop ileostomy with colonic stent placement.  2)Tachycardia with leukocytosis- concern for sepsis    Recommendations:  -monitor GI function through rectum and ileostomy  -IVF hydration, supportive care as per surgical team  -infectious workup       Please call with questions  Ena Lambert  GI Fellow  Pager: [phone]

## 2019-04-25 NOTE — PROGRESS NOTE ADULT - SUBJECTIVE AND OBJECTIVE BOX
COLORECTAL SURGERY DAILY PROGRESS NOTE:    Interval:  No acute events overnight.     Subjective:  Patient seen and examined. Reports pain is well controlled. Denies N/V. Tolerating clears, though not much intake. Ileostomy functioning, also having bowel movements from below.    Vital Signs Last 24 Hrs  T(C): 38.1 (2019 00:38), Max: 38.1 (2019 00:38)  T(F): 100.5 (2019 00:38), Max: 100.5 (2019 00:38)  HR: 125 (2019 00:38) (99 - 125)  BP: 109/64 (2019 00:38) (104/58 - 124/77)  RR: 18 (2019 00:38) (18 - 18)  SpO2: 94% (2019 00:38) (94% - 96%)    Exam:  Gen: NAD, resting in bed, alert and responding appropriately  Resp: Airway patent, non-labored respirations  Abd: Soft, ND, NT, incision dressed d/i, ostomy viable w/ fxn  Ext: WWP    I&O's Detail    2019 07:01  -  2019 07:00  --------------------------------------------------------  IN:    dextrose 5% + sodium chloride 0.45% with potassium chloride 20 mEq/L: 720 mL    Lactated Ringers IV Bolus: 500 mL    Solution: 195 mL  Total IN: 1415 mL    OUT:    Ileostomy: 15 mL    Indwelling Catheter - Urethral: 410 mL  Total OUT: 425 mL    Total NET: 990 mL      2019 07:01  -  2019 05:30  --------------------------------------------------------  IN:    dextrose 5% + lactated ringers.: 340 mL    dextrose 5% + sodium chloride 0.45% with potassium chloride 20 mEq/L: 425 mL    Oral Fluid: 720 mL  Total IN: 1485 mL    OUT:    Ileostomy: 485 mL    Indwelling Catheter - Urethral: 500 mL  Total OUT: 985 mL    Total NET: 500 mL    Daily Weight in k.3 (2019 09:11)    MEDICATIONS  (STANDING):  acetaminophen   Tablet .. 650 milliGRAM(s) Oral every 6 hours  aspirin  chewable 81 milliGRAM(s) Oral daily  cholecalciferol 1000 Unit(s) Oral daily  dextrose 5% + sodium chloride 0.9%. 1000 milliLiter(s) (75 mL/Hr) IV Continuous <Continuous>  doxepin 10 milliGRAM(s) Oral at bedtime  enoxaparin Injectable 40 milliGRAM(s) SubCutaneous daily  metoprolol succinate ER 25 milliGRAM(s) Oral daily  pantoprazole  Injectable 40 milliGRAM(s) IV Push daily  simvastatin 20 milliGRAM(s) Oral at bedtime  sodium chloride 0.9% Bolus 1000 milliLiter(s) IV Bolus once  tamsulosin 0.4 milliGRAM(s) Oral at bedtime    MEDICATIONS  (PRN):  benzocaine 15 mG/menthol 3.6 mG Lozenge 1 Lozenge Oral every 6 hours PRN Sore Throat  HYDROmorphone   Tablet 2 milliGRAM(s) Oral every 4 hours PRN Moderate Pain (4 - 6)  HYDROmorphone   Tablet 4 milliGRAM(s) Oral every 4 hours PRN Severe Pain (7 - 10)  tetracaine/benzocaine/butamben Spray 1 Spray(s) Topical three times a day PRN throat pain      LABS:                        12.7   19.2  )-----------( 222      ( 2019 19:00 )             36.0     -24    131<L>  |  101  |  29<H>  ----------------------------<  153<H>  4.6   |  21<L>  |  1.25    Ca    8.6      2019 19:00  Phos  2.1     -24  Mg     2.3

## 2019-04-25 NOTE — PHYSICAL THERAPY INITIAL EVALUATION ADULT - DIAGNOSIS, PT EVAL
Pt to be d/c from PT program at this time; will place pt on ambulation aide for ongoing mobility in hospital

## 2019-04-26 ENCOUNTER — TRANSCRIPTION ENCOUNTER (OUTPATIENT)
Age: 84
End: 2019-04-26

## 2019-04-26 LAB
ANION GAP SERPL CALC-SCNC: 10 MMOL/L — SIGNIFICANT CHANGE UP (ref 5–17)
BLD GP AB SCN SERPL QL: NEGATIVE — SIGNIFICANT CHANGE UP
BUN SERPL-MCNC: 14 MG/DL — SIGNIFICANT CHANGE UP (ref 7–23)
CALCIUM SERPL-MCNC: 8.7 MG/DL — SIGNIFICANT CHANGE UP (ref 8.4–10.5)
CHLORIDE SERPL-SCNC: 104 MMOL/L — SIGNIFICANT CHANGE UP (ref 96–108)
CO2 SERPL-SCNC: 20 MMOL/L — LOW (ref 22–31)
CREAT SERPL-MCNC: 0.89 MG/DL — SIGNIFICANT CHANGE UP (ref 0.5–1.3)
GLUCOSE SERPL-MCNC: 130 MG/DL — HIGH (ref 70–99)
HCT VFR BLD CALC: 28.2 % — LOW (ref 39–50)
HCT VFR BLD CALC: 30.7 % — LOW (ref 39–50)
HGB BLD-MCNC: 10.5 G/DL — LOW (ref 13–17)
HGB BLD-MCNC: 8.9 G/DL — LOW (ref 13–17)
MAGNESIUM SERPL-MCNC: 1.9 MG/DL — SIGNIFICANT CHANGE UP (ref 1.6–2.6)
MCHC RBC-ENTMCNC: 29.4 PG — SIGNIFICANT CHANGE UP (ref 27–34)
MCHC RBC-ENTMCNC: 31.6 GM/DL — LOW (ref 32–36)
MCHC RBC-ENTMCNC: 31.9 PG — SIGNIFICANT CHANGE UP (ref 27–34)
MCHC RBC-ENTMCNC: 34.3 GM/DL — SIGNIFICANT CHANGE UP (ref 32–36)
MCV RBC AUTO: 93.1 FL — SIGNIFICANT CHANGE UP (ref 80–100)
MCV RBC AUTO: 93.1 FL — SIGNIFICANT CHANGE UP (ref 80–100)
PHOSPHATE SERPL-MCNC: 2.1 MG/DL — LOW (ref 2.5–4.5)
PLATELET # BLD AUTO: 189 K/UL — SIGNIFICANT CHANGE UP (ref 150–400)
PLATELET # BLD AUTO: 210 K/UL — SIGNIFICANT CHANGE UP (ref 150–400)
POTASSIUM SERPL-MCNC: 3.8 MMOL/L — SIGNIFICANT CHANGE UP (ref 3.5–5.3)
POTASSIUM SERPL-SCNC: 3.8 MMOL/L — SIGNIFICANT CHANGE UP (ref 3.5–5.3)
RBC # BLD: 3.03 M/UL — LOW (ref 4.2–5.8)
RBC # BLD: 3.3 M/UL — LOW (ref 4.2–5.8)
RBC # FLD: 12.6 % — SIGNIFICANT CHANGE UP (ref 10.3–14.5)
RBC # FLD: 13.5 % — SIGNIFICANT CHANGE UP (ref 10.3–14.5)
RH IG SCN BLD-IMP: POSITIVE — SIGNIFICANT CHANGE UP
SODIUM SERPL-SCNC: 134 MMOL/L — LOW (ref 135–145)
WBC # BLD: 8.5 K/UL — SIGNIFICANT CHANGE UP (ref 3.8–10.5)
WBC # BLD: 8.75 K/UL — SIGNIFICANT CHANGE UP (ref 3.8–10.5)
WBC # FLD AUTO: 8.5 K/UL — SIGNIFICANT CHANGE UP (ref 3.8–10.5)
WBC # FLD AUTO: 8.75 K/UL — SIGNIFICANT CHANGE UP (ref 3.8–10.5)

## 2019-04-26 RX ORDER — BENZOCAINE AND MENTHOL 5; 1 G/100ML; G/100ML
1 LIQUID ORAL
Qty: 0 | Refills: 0 | Status: DISCONTINUED | OUTPATIENT
Start: 2019-04-26 | End: 2019-04-29

## 2019-04-26 RX ORDER — LOPERAMIDE HCL 2 MG
2 TABLET ORAL
Qty: 0 | Refills: 0 | Status: DISCONTINUED | OUTPATIENT
Start: 2019-04-26 | End: 2019-04-29

## 2019-04-26 RX ORDER — PSYLLIUM SEED (WITH DEXTROSE)
1 POWDER (GRAM) ORAL THREE TIMES A DAY
Qty: 0 | Refills: 0 | Status: DISCONTINUED | OUTPATIENT
Start: 2019-04-26 | End: 2019-04-27

## 2019-04-26 RX ORDER — POTASSIUM PHOSPHATE, MONOBASIC POTASSIUM PHOSPHATE, DIBASIC 236; 224 MG/ML; MG/ML
15 INJECTION, SOLUTION INTRAVENOUS ONCE
Qty: 0 | Refills: 0 | Status: COMPLETED | OUTPATIENT
Start: 2019-04-26 | End: 2019-04-26

## 2019-04-26 RX ORDER — MAGNESIUM SULFATE 500 MG/ML
1 VIAL (ML) INJECTION ONCE
Qty: 0 | Refills: 0 | Status: COMPLETED | OUTPATIENT
Start: 2019-04-26 | End: 2019-04-26

## 2019-04-26 RX ORDER — PANTOPRAZOLE SODIUM 20 MG/1
40 TABLET, DELAYED RELEASE ORAL DAILY
Qty: 0 | Refills: 0 | Status: DISCONTINUED | OUTPATIENT
Start: 2019-04-26 | End: 2019-04-29

## 2019-04-26 RX ADMIN — ENOXAPARIN SODIUM 40 MILLIGRAM(S): 100 INJECTION SUBCUTANEOUS at 11:51

## 2019-04-26 RX ADMIN — Medication 2 MILLIGRAM(S): at 11:50

## 2019-04-26 RX ADMIN — Medication 20 MILLIGRAM(S): at 22:18

## 2019-04-26 RX ADMIN — Medication 1000 UNIT(S): at 11:50

## 2019-04-26 RX ADMIN — Medication 650 MILLIGRAM(S): at 17:36

## 2019-04-26 RX ADMIN — TAMSULOSIN HYDROCHLORIDE 0.4 MILLIGRAM(S): 0.4 CAPSULE ORAL at 22:18

## 2019-04-26 RX ADMIN — Medication 650 MILLIGRAM(S): at 06:00

## 2019-04-26 RX ADMIN — PANTOPRAZOLE SODIUM 40 MILLIGRAM(S): 20 TABLET, DELAYED RELEASE ORAL at 11:51

## 2019-04-26 RX ADMIN — Medication 100 GRAM(S): at 09:12

## 2019-04-26 RX ADMIN — Medication 50 MILLIGRAM(S): at 06:00

## 2019-04-26 RX ADMIN — POTASSIUM PHOSPHATE, MONOBASIC POTASSIUM PHOSPHATE, DIBASIC 62.5 MILLIMOLE(S): 236; 224 INJECTION, SOLUTION INTRAVENOUS at 11:43

## 2019-04-26 RX ADMIN — PANTOPRAZOLE SODIUM 40 MILLIGRAM(S): 20 TABLET, DELAYED RELEASE ORAL at 05:58

## 2019-04-26 RX ADMIN — Medication 650 MILLIGRAM(S): at 11:50

## 2019-04-26 RX ADMIN — Medication 2 MILLIGRAM(S): at 17:36

## 2019-04-26 RX ADMIN — Medication 650 MILLIGRAM(S): at 12:30

## 2019-04-26 RX ADMIN — SIMVASTATIN 20 MILLIGRAM(S): 20 TABLET, FILM COATED ORAL at 22:18

## 2019-04-26 RX ADMIN — SODIUM CHLORIDE 75 MILLILITER(S): 9 INJECTION, SOLUTION INTRAVENOUS at 11:43

## 2019-04-26 RX ADMIN — Medication 650 MILLIGRAM(S): at 18:03

## 2019-04-26 NOTE — DIETITIAN INITIAL EVALUATION ADULT. - NS AS NUTRI INTERV ED CONTENT
Reviewed Ileostomy nutrition therapy/food list handout (provided by ostomy RN). Discussed foods recommended, chewing foods well, small frequent meals high in protein & energy. Reviewed foods that may cause odors &/or gas, discussed foods that bulk stool and thin stool, and importance of adequate hydration. Discussed avoiding juices/foods high in sugar when output is high.

## 2019-04-26 NOTE — DIETITIAN INITIAL EVALUATION ADULT. - NS FNS REASON FOR WEIGHT CHANG
altered GI function (specify)/Pt reports eating well for the most part PTA, however has  had a lot of diarrhea over the past 6 months which pt believes contributed to weight loss/other (specify)

## 2019-04-26 NOTE — CONSULT NOTE ADULT - SUBJECTIVE AND OBJECTIVE BOX
asked to evaluate cardiac status for sinus tachycardia  HPI:  82 yo male with h/o HTN, HLD, CAD (x 1 NAI to CX 11/14/2014) and history of colon resection (2001) for malignant sigmoid polyp. Since 10/2018 pt c/o abdominal pain, change in bowel habits and unintentional 25 lb weight loss. In October 2018, pt underwent upper endoscopy/flexible sigmoidoscopy at [place] and dilation of SC anastomosis, during which he states his bowel was perforated. Pt underwent another flexible sigmoidoscopy 11/6/2018 with biopsy (pathology benign). On 11/28/19, pt was admitted again for C Diff. In March 2019, laparoscopic appendectomy was performed (per pt, only part of the appendix was able to be removed). Imaging revealed lesion in lung and liver. Virtual colonoscopy recealed narrowing at the prior sigmoid anastomosis with proximal dilation. Pt is scheduled for Open Anterior Resection, Possible Ostomy and Cystoscopy Insertion of Ureteral Catheters on 4/23/2019.     Patient is s/p colom surgery colostomy  post op sinus tachycardia no cp or sob better today  beta blocker increased  difficulty swallowing  presently denies cp or palpitations  hx of stent 2014  ecg no acute cahnges    MEDICATIONS:  MEDICATIONS  (STANDING):  acetaminophen   Tablet .. 650 milliGRAM(s) Oral every 6 hours  cholecalciferol 1000 Unit(s) Oral daily  dextrose 5% + sodium chloride 0.9%. 1000 milliLiter(s) (75 mL/Hr) IV Continuous <Continuous>  doxepin 20 milliGRAM(s) Oral at bedtime  enoxaparin Injectable 40 milliGRAM(s) SubCutaneous daily  metoprolol succinate ER 50 milliGRAM(s) Oral daily  pantoprazole  Injectable 40 milliGRAM(s) IV Push two times a day  simvastatin 20 milliGRAM(s) Oral at bedtime  tamsulosin 0.4 milliGRAM(s) Oral at bedtime      PHYSICAL EXAM:  T(C): 37.6 (04-26-19 @ 05:35), Max: 37.7 (04-25-19 @ 17:34)  HR: 115 (04-26-19 @ 05:35) (84 - 134)  BP: 108/69 (04-26-19 @ 05:35) (96/67 - 119/68)  RR: 18 (04-26-19 @ 05:35) (18 - 18)  SpO2: 97% (04-26-19 @ 05:35) (95% - 97%)  Wt(kg): --  I&O's Summary    25 Apr 2019 07:01  -  26 Apr 2019 07:00  --------------------------------------------------------  IN: 4872 mL / OUT: 6600 mL / NET: -1728 mL          Appearance: Normal NAD	  HEENT:   Normal oral mucosa, PERRL, EOMI	  Cardiovascular: Normal S1 S2, No JVD, No murmurs , regular  Respiratory: Lungs clear to auscultation, normal effort 	  Gastrointestinal:  colsostomy present  Skin: No rashes, No ecchymoses, No cyanosis, warm to touch  Musculoskeletal: Normal range of motion, normal strength  Psychiatry:  Mood & affect appropriate  Ext: No edema        LABS:    CARDIAC MARKERS:  CARDIAC MARKERS ( 25 Apr 2019 10:19 )  x     / x     / 148 U/L / x     / 1.5 ng/mL                                10.4   13.2  )-----------( 205      ( 25 Apr 2019 15:35 )             32.4     04-25    135  |  104  |  20  ----------------------------<  147<H>  4.4   |  20<L>  |  0.97    Ca    8.9      25 Apr 2019 15:35  Phos  2.8     04-25  Mg     2.1     04-25      proBNP:   Lipid Profile:   HgA1c:   TSH:           TELEMETRY: 	    ECG:  	< from: 12 Lead ECG (04.25.19 @ 08:34) >  Diagnosis Line SINUS TACHYCARDIA WITH OCCASIONAL PREMATURE VENTRICULAR COMPLEXES  NONSPECIFIC T WAVE ABNORMALITY  ABNORMAL ECG      RADIOLOGY:   < from: Xray Chest 1 View- PORTABLE-Urgent (04.25.19 @ 08:53) >  FINDINGS:  Left lower lung linear opacities, consistent with linear atelectasis.  The cardiomediastinal silhouette is unremarkable.  The visualized osseous and soft tissue structures demonstrate no acute   pathology.    There is no gross evidence of intraperitoneal free air, but it cannot be   completely excluded on this radiograph alone. If further concern   persists, consider left side down lateral radiograph or formal upright PA   radiograph in the radiology Department.    IMPRESSION:  Left basilar atelectasis.    There is no gross evidence of intraperitoneal free air, but it cannot be   completely excluded on this radiograph alone. If further concern   persists, consider left side down lateral radiograph or formal upright PA   radiograph in the radiology Department.
Chief Complaint:  Patient is a 83y old  Male who presents with a chief complaint of colon cancer with mets status post resection (24 Apr 2019 04:42)      HPI:  83M with CAD s/p NAI x 1 2014, Malignant sigmoid polyp s/p resection, HTN, HLD presenting with abdominal pain, weight loss (~25lbs unintentional). Recent imaging with liver and lung mets in addition to sigmoid narrowing on virtual colonoscopy. Patient presented to [facility] for elective surgery today and was found to have rectal mass causing complete colonic obstruction. Loop ileostomy was performed in addition to colonic stent placement across rectal mass by Dr. Cleary.   The patient is feeling "ok" today. He is having scant dark output from ileostomy. He is having significant output of stool from rectum. He is complaining of a sore throat but otherwise no chest pain or SOB.   His wife is present at bedside.     Allergies:  No Known Allergies  oxycodone (Other)  OxyContin (Other)      Home Medications: reviewed     Hospital Medications:  acetaminophen  IVPB .. 650 milliGRAM(s) IV Intermittent once  acetaminophen  IVPB .. 650 milliGRAM(s) IV Intermittent once  aspirin  chewable 81 milliGRAM(s) Oral daily  benzocaine 15 mG/menthol 3.6 mG Lozenge 1 Lozenge Oral once  dextrose 5% + sodium chloride 0.45% with potassium chloride 20 mEq/L 1000 milliLiter(s) IV Continuous <Continuous>  doxepin 10 milliGRAM(s) Oral at bedtime  enoxaparin Injectable 40 milliGRAM(s) SubCutaneous daily  HYDROmorphone  Injectable 0.5 milliGRAM(s) IV Push every 3 hours PRN  HYDROmorphone  Injectable 0.25 milliGRAM(s) IV Push every 3 hours PRN  ketorolac   Injectable 7.5 milliGRAM(s) IV Push every 6 hours  metoprolol tartrate Injectable 5 milliGRAM(s) IV Push every 6 hours  ondansetron Injectable 4 milliGRAM(s) IV Push once  sodium phosphate IVPB 15 milliMole(s) IV Intermittent once      PMHX/PSHX:  Aortic stenosis  Other specified diseases of appendix  Cancer of colon  CAD (coronary artery disease)  HLD (hyperlipidemia)  Hypertension  S/P cataract surgery  History of prostate surgery  History of colon resection  History of appendectomy  Stented coronary artery      Family history:  non-contributory     Social History: no tobacco, no ETOH, no IVDA     ROS:     General:  No wt loss, fevers, chills, night sweats, fatigue,   Eyes:  Good vision, no reported pain  ENT:  No sore throat, pain, runny nose, dysphagia  CV:  No pain, palpitations, hypo/hypertension  Resp:  No dyspnea, cough, tachypnea, wheezing  GI:  No pain, No nausea, No vomiting, + diarrhea, No constipation, No weight loss, No fever, No pruritis, No rectal bleeding, No tarry stools, No dysphagia,  :  No pain, bleeding, incontinence, nocturia  Muscle:  No pain, weakness  Neuro:  No weakness, tingling, memory problems  Psych:  No fatigue, insomnia, mood problems, depression  Endocrine:  No polyuria, polydipsia, cold/heat intolerance  Heme:  No petechiae, ecchymosis, easy bruisability  Skin:  No rash, tattoos, scars, edema      PHYSICAL EXAM:     GENERAL:  Appears stated age, well-groomed, well-nourished, no distress  HEENT:  NC/AT,  conjunctivae clear and pink, no thyromegaly, nodules, adenopathy, no JVD, sclera -anicteric  CHEST:  Full & symmetric excursion, no increased effort, breath sounds clear  HEART:  Regular rhythm, S1, S2, no murmur/rub/S3/S4, no abdominal bruit, no edema  ABDOMEN:  Soft, non-tender, non-distended, normoactive bowel sounds +loop ileostomy   EXTEREMITIES:  no cyanosis,clubbing or edema  SKIN:  No rash/erythema, intact   NEURO:  Alert, oriented, no asterixis, no tremor, no encephalopathy    Vital Signs:  Vital Signs Last 24 Hrs  T(C): 36.7 (24 Apr 2019 05:17), Max: 37.1 (23 Apr 2019 19:11)  T(F): 98 (24 Apr 2019 05:17), Max: 98.8 (23 Apr 2019 19:11)  HR: 106 (24 Apr 2019 05:17) (70 - 106)  BP: 106/68 (24 Apr 2019 05:17) (96/59 - 138/68)  BP(mean): 80 (23 Apr 2019 18:45) (71 - 95)  RR: 18 (24 Apr 2019 05:17) (12 - 18)  SpO2: 95% (24 Apr 2019 05:17) (92% - 100%)  Daily     Daily     LABS:                        12.0   11.65 )-----------( 226      ( 24 Apr 2019 08:45 )             37.6     Mean Cell Volume: 91.9 fl (04-24-19 @ 08:45)    04-24    133<L>  |  102  |  24<H>  ----------------------------<  147<H>  5.2   |  24  |  1.43<H>    Ca    8.5      24 Apr 2019 07:01  Phos  2.6     04-24  Mg     2.2     04-24                                    12.0   11.65 )-----------( 226      ( 24 Apr 2019 08:45 )             37.6     Imaging:  Operative report (brief):  Bilateral u-caths placed pre-operatively.  Laparotomy incision made, small bowel and cecum - which were adherent in pelvis - were freed with combination of sharp and blunt dissection as well as TA stapler. Disease was palpated in rectum and was noted to extend posteriorly and to the right, involving the cecum which was adherent to anterior abdominal wall. We were able to palpate both of the U-caths and the ureters were well away from our dissection. The diseased part of rectum was noted to be closely and extensively adherent to the bladder.  At this point we made the decision to consult GI to attempt endoscopic stent placement across the rectal mass. This was accomplished with copious amount of stool passing through. We matured a loop ileostomy on the left side of patient because of the adherent cecum on the right. Fascia closed with figure of eight stitches. Skin stapled. Ileostomy brooked.

## 2019-04-26 NOTE — DISCHARGE NOTE PROVIDER - CARE PROVIDER_API CALL
Keanu Swain)  ColonRectal Surgery; Surgery  [address], [address]  [place] [address]  Phone: [phone]  Fax: [phone]  Follow Up Time: 2 weeks    Dannie Zamarripa)  Gastroenterology  [address] [place] [address]  Phone: [phone]  Fax: [phone]  Follow Up Time: 2 weeks

## 2019-04-26 NOTE — DISCHARGE NOTE PROVIDER - PROVIDER TOKENS
PROVIDER:[TOKEN:[[id number]],FOLLOWUP:[2 weeks]],PROVIDER:[TOKEN:[[id number]],FOLLOWUP:[2 weeks]]

## 2019-04-26 NOTE — DISCHARGE NOTE PROVIDER - CARE PROVIDERS DIRECT ADDRESSES
[email or web address]

## 2019-04-26 NOTE — DISCHARGE NOTE PROVIDER - NSDCHHNEEDSERVICE_GEN_ALL_CORE
Ostomy care and management Teaching and training/Ostomy care and management/Medication teaching and assessment

## 2019-04-26 NOTE — PROGRESS NOTE ADULT - PROBLEM SELECTOR PLAN 1
day 3 post op colon resection illeostomy stent placement dvt prophylaxis surg protocol followed by gi for stent check next week

## 2019-04-26 NOTE — DISCHARGE NOTE PROVIDER - NSDCCPCAREPLAN_GEN_ALL_CORE_FT
PRINCIPAL DISCHARGE DIAGNOSIS  Diagnosis: Malignant neoplasm of colon, unspecified  Assessment and Plan of Treatment: PRINCIPAL DISCHARGE DIAGNOSIS  Diagnosis: Malignant neoplasm of colon, unspecified  Assessment and Plan of Treatment: Activity- No heavy lifting or straining over 15 lbs for the next two weeks;  Driving- Please do not drive until your pain is well controlled and you do not need to take pain medications.  You may shower-Do not submerge or scrub incision sites.  Please pat dry incisions/dressings. Staples will be removed at your first post op visit.      SECONDARY DISCHARGE DIAGNOSES  Diagnosis: Hypertension  Assessment and Plan of Treatment: Hypertension    Diagnosis: CAD (coronary artery disease)  Assessment and Plan of Treatment: CAD (coronary artery disease)

## 2019-04-26 NOTE — DISCHARGE NOTE PROVIDER - NSDCFUADDINST_GEN_ALL_CORE_FT
Ostomy care as instructed.   Empty and record daily ostomy output. Bring records with you at your follow up appointment with Dr. Swain. If ostomy output greater than 1 Lt per day, please call your surgeon's office. Ostomy care as instructed.   Empty and record daily ostomy output. Bring records with you at your follow up appointment with Dr. Swain. If ostomy output greater than 1 Lt per day, please call your surgeon's office.    Please call to schedule follow up appointments within 2 weeks of your hospital discharge with Dr. Zamarripa (GI) and Dr. Swain (Surgery).     If you notice that you have gone several days without a bowel movement, you have severe or worsening abdominal pain, nausea and vomiting, or if you have a fever of greater than 100.4F please call the contact information listed below or return to the ED. Ostomy care as instructed.   Empty and record daily ostomy output. Bring records with you at your follow up appointment with Dr. Swain. Normal ostomy output is 600-1200/day.   Please take 4mg of Imodium QID (four times a day).  The lomotil is only in case you need it for high ostomy output but you don't need to take it regularly.     Please call to schedule follow up appointments within 2 weeks of your hospital discharge with Dr. Zamarripa (GI) and Dr. Swain (Surgery).     If you notice that you have gone several days without a bowel movement, you have severe or worsening abdominal pain, nausea and vomiting, or if you have a fever of greater than 100.4F please call the contact information listed below or return to the ED.

## 2019-04-26 NOTE — DISCHARGE NOTE PROVIDER - HOSPITAL COURSE
83yM hx of colon ca p/w large bowel obstruction for resection.  Patient underwent surgical management with creation of ileostomy. Patient tolerated the procedure well and recovered appropriately.  Postoperative course was complicated by high ostomy output, which was managed with diet and pharmacologic changes.  The patient subsequently had appropriate ostomy output and was deemed stable for discharge with outpatient followup.        At time of discharge, the patient was hemodynamically stable, was tolerating PO diet, was voiding urine and passing stool, was ambulating, and was comfortable with adequate pain control. The patient was instructed to follow up with Dr. Swain within 1-2 weeks after discharge from the hospital. The patient felt comfortable with discharge. The patient was discharged to home. The patient had no other issues and was recovering appropriately. 83yM hx of colon ca p/w large bowel obstruction for resection.  Patient underwent surgical management with creation of ileostomy. Patient tolerated the procedure well and recovered appropriately.  Postoperative course was complicated by high ostomy output, which was managed with diet and pharmacologic changes.  The patient subsequently had appropriate ostomy output and was deemed stable for discharge with outpatient followup.        At time of discharge, the patient was hemodynamically stable, was tolerating PO diet, was voiding urine and passing stool, was ambulating, and was comfortable with adequate pain control. The patient was instructed to follow up with Dr. Swain within 1-2 weeks after discharge from the hospital. The patient felt comfortable with discharge. The patient was discharged to home with Lovneox for DVT ppx. The patient had no other issues and was recovering appropriately.

## 2019-04-26 NOTE — CONSULT NOTE ADULT - ASSESSMENT
1. sinus tachycardia post op no evidence of chf no sob no cp oar palitations- aapears somewhat volume deleted  2. ECG no changes no acute findings  3. doubt PE or chf or ischemia    Recommend  hydrate   continue beta blocker  echo and CT angio ordered dopplers ordered
Impression:  1)Sigmoid obstruction- suspect secondary to recurrent colorectal malignancy, liver and lung mets are seen on imaging, s/p loop ileostomy with colonic stent placement.    Recommendations:  -monitor GI function through rectum and ileostomy  -IVF hydration, supportive care as per surgical team  -will discuss with Dr. Cleary, potential repeat endoscopic evaluation next week   -appreciate surgical care     Please call with questions  Ena Lambert  GI Fellow  Pager: [phone]

## 2019-04-26 NOTE — PROGRESS NOTE ADULT - SUBJECTIVE AND OBJECTIVE BOX
COLORECTAL SURGERY DAILY PROGRESS NOTE:    Interval:  No acute events overnight. Metoprolol increased to 50 mg.    Subjective:  Patient seen and examined. Reports pain is well controlled. Denies N/V. Tolerating diet. Ileostomy functioning.    Vital Signs Last 24 Hrs  T(C): 37.4 (25 Apr 2019 21:30), Max: 38.1 (25 Apr 2019 00:38)  T(F): 99.3 (25 Apr 2019 21:30), Max: 100.5 (25 Apr 2019 00:38)  HR: 84 (25 Apr 2019 21:30) (84 - 134)  BP: 113/69 (25 Apr 2019 21:30) (96/67 - 114/67)  RR: 18 (25 Apr 2019 21:30) (18 - 18)  SpO2: 96% (25 Apr 2019 21:30) (94% - 97%)    Exam:  Gen: NAD, resting in bed, alert and responding appropriately  Resp: Airway patent, non-labored respirations  Abd: Soft, ND, NT, incision dressed d/i, ileostomy viable w/ fxn  Ext: WWP    I&O's Detail    24 Apr 2019 07:01  -  25 Apr 2019 07:00  --------------------------------------------------------  IN:    dextrose 5% + lactated ringers.: 340 mL    dextrose 5% + sodium chloride 0.45% with potassium chloride 20 mEq/L: 425 mL    dextrose 5% + sodium chloride 0.9%.: 750 mL    Oral Fluid: 720 mL    Sodium Chloride 0.9% IV Bolus: 1000 mL  Total IN: 3235 mL    OUT:    Ileostomy: 585 mL    Indwelling Catheter - Urethral: 650 mL  Total OUT: 1235 mL    Total NET: 2000 mL      25 Apr 2019 07:01  -  26 Apr 2019 00:27  --------------------------------------------------------  IN:    dextrose 5% + sodium chloride 0.9%.: 900 mL    Lactated Ringers IV Bolus: 1000 mL    Oral Fluid: 1322 mL    Solution: 500 mL  Total IN: 3722 mL    OUT:    Ileostomy: 5000 mL    Indwelling Catheter - Urethral: 550 mL  Total OUT: 5550 mL    Total NET: -1828 mL      MEDICATIONS  (STANDING):  acetaminophen   Tablet .. 650 milliGRAM(s) Oral every 6 hours  cholecalciferol 1000 Unit(s) Oral daily  dextrose 5% + sodium chloride 0.9%. 1000 milliLiter(s) (75 mL/Hr) IV Continuous <Continuous>  doxepin 20 milliGRAM(s) Oral at bedtime  enoxaparin Injectable 40 milliGRAM(s) SubCutaneous daily  metoprolol succinate ER 50 milliGRAM(s) Oral daily  pantoprazole  Injectable 40 milliGRAM(s) IV Push two times a day  simvastatin 20 milliGRAM(s) Oral at bedtime  tamsulosin 0.4 milliGRAM(s) Oral at bedtime    MEDICATIONS  (PRN):  benzocaine 15 mG/menthol 3.6 mG Lozenge 1 Lozenge Oral every 6 hours PRN Sore Throat  HYDROmorphone   Tablet 2 milliGRAM(s) Oral every 4 hours PRN Moderate Pain (4 - 6)  HYDROmorphone   Tablet 4 milliGRAM(s) Oral every 4 hours PRN Severe Pain (7 - 10)  tetracaine/benzocaine/butamben Spray 1 Spray(s) Topical three times a day PRN throat pain      LABS:                        10.4   13.2  )-----------( 205      ( 25 Apr 2019 15:35 )             32.4     04-25    135  |  104  |  20  ----------------------------<  147<H>  4.4   |  20<L>  |  0.97    Ca    8.9      25 Apr 2019 15:35  Phos  2.8     04-25  Mg     2.1     04-25 COLORECTAL SURGERY DAILY PROGRESS NOTE:    Interval:  No acute events overnight. Metoprolol increased to 50 mg. Ileostomy w/ high output.    Subjective:  Patient seen and examined. Reports pain is well controlled. Denies N/V. Tolerating diet. Ileostomy functioning w/ high output.    Vital Signs Last 24 Hrs  T(C): 37.4 (25 Apr 2019 21:30), Max: 38.1 (25 Apr 2019 00:38)  T(F): 99.3 (25 Apr 2019 21:30), Max: 100.5 (25 Apr 2019 00:38)  HR: 84 (25 Apr 2019 21:30) (84 - 134)  BP: 113/69 (25 Apr 2019 21:30) (96/67 - 114/67)  RR: 18 (25 Apr 2019 21:30) (18 - 18)  SpO2: 96% (25 Apr 2019 21:30) (94% - 97%)    Exam:  Gen: NAD, resting in bed, alert and responding appropriately  Resp: Airway patent, non-labored respirations  Abd: Soft, ND, NT, incision dressed d/i, ileostomy viable w/ fxn  Ext: WWP    I&O's Detail    24 Apr 2019 07:01  -  25 Apr 2019 07:00  --------------------------------------------------------  IN:    dextrose 5% + lactated ringers.: 340 mL    dextrose 5% + sodium chloride 0.45% with potassium chloride 20 mEq/L: 425 mL    dextrose 5% + sodium chloride 0.9%.: 750 mL    Oral Fluid: 720 mL    Sodium Chloride 0.9% IV Bolus: 1000 mL  Total IN: 3235 mL    OUT:    Ileostomy: 585 mL    Indwelling Catheter - Urethral: 650 mL  Total OUT: 1235 mL    Total NET: 2000 mL      25 Apr 2019 07:01  -  26 Apr 2019 00:27  --------------------------------------------------------  IN:    dextrose 5% + sodium chloride 0.9%.: 900 mL    Lactated Ringers IV Bolus: 1000 mL    Oral Fluid: 1322 mL    Solution: 500 mL  Total IN: 3722 mL    OUT:    Ileostomy: 5000 mL    Indwelling Catheter - Urethral: 550 mL  Total OUT: 5550 mL    Total NET: -1828 mL      MEDICATIONS  (STANDING):  acetaminophen   Tablet .. 650 milliGRAM(s) Oral every 6 hours  cholecalciferol 1000 Unit(s) Oral daily  dextrose 5% + sodium chloride 0.9%. 1000 milliLiter(s) (75 mL/Hr) IV Continuous <Continuous>  doxepin 20 milliGRAM(s) Oral at bedtime  enoxaparin Injectable 40 milliGRAM(s) SubCutaneous daily  metoprolol succinate ER 50 milliGRAM(s) Oral daily  pantoprazole  Injectable 40 milliGRAM(s) IV Push two times a day  simvastatin 20 milliGRAM(s) Oral at bedtime  tamsulosin 0.4 milliGRAM(s) Oral at bedtime    MEDICATIONS  (PRN):  benzocaine 15 mG/menthol 3.6 mG Lozenge 1 Lozenge Oral every 6 hours PRN Sore Throat  HYDROmorphone   Tablet 2 milliGRAM(s) Oral every 4 hours PRN Moderate Pain (4 - 6)  HYDROmorphone   Tablet 4 milliGRAM(s) Oral every 4 hours PRN Severe Pain (7 - 10)  tetracaine/benzocaine/butamben Spray 1 Spray(s) Topical three times a day PRN throat pain      LABS:                        10.4   13.2  )-----------( 205      ( 25 Apr 2019 15:35 )             32.4     04-25    135  |  104  |  20  ----------------------------<  147<H>  4.4   |  20<L>  |  0.97    Ca    8.9      25 Apr 2019 15:35  Phos  2.8     04-25  Mg     2.1     04-25

## 2019-04-26 NOTE — DIETITIAN INITIAL EVALUATION ADULT. - OTHER INFO
Pt seen for nutrition education. Per chart,  POD#3 s/p b/l ucath placement, ex-lap, intraoperative rectal stent placement by GI, loop ileostomy creation (unresectable mass) (4/23). Pt reports fair appetite since diet advanced, consuming 50% of meals. Pt denies any history of chewing/swallowing difficulty or GI distress at this time. Ileostomy output 5750ml x 24 hours. Pt given Imodium and Metamucil powder. Pt reports today output is much less (currently <100ml since morning). Pt amenable to ileostomy nutrition therapy, provided and noted below.

## 2019-04-26 NOTE — DIETITIAN INITIAL EVALUATION ADULT. - ORAL INTAKE PTA
good/Pt reports good appetite and PO intake PTA. Typical day as follows; breakfast: cereal and banana, lunch: sandwich, dinner: meat, vegetables, starch. Pt takes vitamin D. NKFA.

## 2019-04-26 NOTE — DIETITIAN INITIAL EVALUATION ADULT. - ENERGY NEEDS
Ht: 66 Wt: 148.5 pounds BMI: 24kg/m2 IBW: 142  pounds(+/-10%)   No edema. No pressure ulcers documented.

## 2019-04-26 NOTE — PROGRESS NOTE ADULT - ASSESSMENT
83M hx colon cancer s/p resection (2001) who had a recurrence and is now POD#3 s/p b/l ucath placement, ex-lap, intraoperative rectal stent placement by GI, loop ileostomy creation (unresectable mass) (4/23)    - Pain control  - Monitor HR (EKG 4/25 sinus tachycardia)  - LRD, holding ASA  - Keep [place] until Friday 4/26  - DVT ppx: Lovenox, OOBA    Dispo: floors, possible home    JERROD Batista, PGY-1  [facility]  [id number] with any questions 83M hx colon cancer s/p resection (2001) who had a recurrence and is now POD#3 s/p b/l ucath placement, ex-lap, intraoperative rectal stent placement by GI, loop ileostomy creation (unresectable mass) (4/23)    - Pain control  - Monitor HR (EKG 4/25 sinus tachycardia)  - LRD, holding ASA  - Replace fluid losses 2:1  - Keep [place] until Friday 4/26  - DVT ppx: Lovenox, OOBA    Dispo: floors, possible home    JERROD Batista, PGY-1  [facility] Surgery  [id number] with any questions

## 2019-04-26 NOTE — PROGRESS NOTE ADULT - ASSESSMENT
pt with high bp hyperlipidemia with colon cancer with mets and obstructing lesion sigmoid colon day3 post op illeostomy and stent placement  oob to chair  advance diet as tolerated dvt prophylaxis surg protocol  monitor pulse  increased b blockers  card consult  and echo if heart rate  continues to go over 100

## 2019-04-26 NOTE — DISCHARGE NOTE PROVIDER - NSDCCPTREATMENT_GEN_ALL_CORE_FT
PRINCIPAL PROCEDURE  Procedure: Exploratory laparotomy  Findings and Treatment: If you notice that you have gone several days without a bowel movement, you have severe or worsening abdominal pain, nausea and vomiting, or if you have a fever of greater than 100.4F please call the contact information listed below or return to the ED.      SECONDARY PROCEDURE  Procedure: Sigmoidoscopy  Findings and Treatment:     Procedure: Ileostomy, loop, open  Findings and Treatment:

## 2019-04-26 NOTE — PROGRESS NOTE ADULT - PROBLEM SELECTOR PLAN 3
continue monitor and meds as needed b blockers increased for episode rapid heart rate asymptomatic bp well controlled

## 2019-04-27 LAB
ANION GAP SERPL CALC-SCNC: 9 MMOL/L — SIGNIFICANT CHANGE UP (ref 5–17)
BUN SERPL-MCNC: 9 MG/DL — SIGNIFICANT CHANGE UP (ref 7–23)
CALCIUM SERPL-MCNC: 8.9 MG/DL — SIGNIFICANT CHANGE UP (ref 8.4–10.5)
CHLORIDE SERPL-SCNC: 107 MMOL/L — SIGNIFICANT CHANGE UP (ref 96–108)
CO2 SERPL-SCNC: 21 MMOL/L — LOW (ref 22–31)
CREAT SERPL-MCNC: 0.83 MG/DL — SIGNIFICANT CHANGE UP (ref 0.5–1.3)
GLUCOSE SERPL-MCNC: 97 MG/DL — SIGNIFICANT CHANGE UP (ref 70–99)
HCT VFR BLD CALC: 27.2 % — LOW (ref 39–50)
HCT VFR BLD CALC: 27.9 % — LOW (ref 39–50)
HGB BLD-MCNC: 8.6 G/DL — LOW (ref 13–17)
HGB BLD-MCNC: 8.7 G/DL — LOW (ref 13–17)
MAGNESIUM SERPL-MCNC: 1.8 MG/DL — SIGNIFICANT CHANGE UP (ref 1.6–2.6)
MCHC RBC-ENTMCNC: 29.3 PG — SIGNIFICANT CHANGE UP (ref 27–34)
MCHC RBC-ENTMCNC: 29.6 PG — SIGNIFICANT CHANGE UP (ref 27–34)
MCHC RBC-ENTMCNC: 31.2 GM/DL — LOW (ref 32–36)
MCHC RBC-ENTMCNC: 31.6 GM/DL — LOW (ref 32–36)
MCV RBC AUTO: 93.5 FL — SIGNIFICANT CHANGE UP (ref 80–100)
MCV RBC AUTO: 93.9 FL — SIGNIFICANT CHANGE UP (ref 80–100)
PHOSPHATE SERPL-MCNC: 2.3 MG/DL — LOW (ref 2.5–4.5)
PLATELET # BLD AUTO: 220 K/UL — SIGNIFICANT CHANGE UP (ref 150–400)
PLATELET # BLD AUTO: 241 K/UL — SIGNIFICANT CHANGE UP (ref 150–400)
POTASSIUM SERPL-MCNC: 4.2 MMOL/L — SIGNIFICANT CHANGE UP (ref 3.5–5.3)
POTASSIUM SERPL-SCNC: 4.2 MMOL/L — SIGNIFICANT CHANGE UP (ref 3.5–5.3)
RBC # BLD: 2.91 M/UL — LOW (ref 4.2–5.8)
RBC # BLD: 2.97 M/UL — LOW (ref 4.2–5.8)
RBC # FLD: 13.4 % — SIGNIFICANT CHANGE UP (ref 10.3–14.5)
RBC # FLD: 13.4 % — SIGNIFICANT CHANGE UP (ref 10.3–14.5)
SODIUM SERPL-SCNC: 137 MMOL/L — SIGNIFICANT CHANGE UP (ref 135–145)
WBC # BLD: 7.31 K/UL — SIGNIFICANT CHANGE UP (ref 3.8–10.5)
WBC # BLD: 7.37 K/UL — SIGNIFICANT CHANGE UP (ref 3.8–10.5)
WBC # FLD AUTO: 7.31 K/UL — SIGNIFICANT CHANGE UP (ref 3.8–10.5)
WBC # FLD AUTO: 7.37 K/UL — SIGNIFICANT CHANGE UP (ref 3.8–10.5)

## 2019-04-27 RX ORDER — SUCRALFATE 1 G
1 TABLET ORAL
Qty: 0 | Refills: 0 | Status: DISCONTINUED | OUTPATIENT
Start: 2019-04-27 | End: 2019-04-29

## 2019-04-27 RX ORDER — LOPERAMIDE HCL 2 MG
1 TABLET ORAL
Qty: 56 | Refills: 0 | OUTPATIENT
Start: 2019-04-27 | End: 2019-05-10

## 2019-04-27 RX ORDER — TAMSULOSIN HYDROCHLORIDE 0.4 MG/1
1 CAPSULE ORAL
Qty: 14 | Refills: 0 | OUTPATIENT
Start: 2019-04-27 | End: 2019-05-10

## 2019-04-27 RX ORDER — MAGNESIUM SULFATE 500 MG/ML
2 VIAL (ML) INJECTION ONCE
Qty: 0 | Refills: 0 | Status: COMPLETED | OUTPATIENT
Start: 2019-04-27 | End: 2019-04-27

## 2019-04-27 RX ORDER — HYDROMORPHONE HYDROCHLORIDE 2 MG/ML
1 INJECTION INTRAMUSCULAR; INTRAVENOUS; SUBCUTANEOUS
Qty: 12 | Refills: 0 | OUTPATIENT
Start: 2019-04-27 | End: 2019-04-29

## 2019-04-27 RX ORDER — ACETAMINOPHEN 500 MG
2 TABLET ORAL
Qty: 0 | Refills: 0 | COMMUNITY
Start: 2019-04-27

## 2019-04-27 RX ADMIN — Medication 1 GRAM(S): at 12:39

## 2019-04-27 RX ADMIN — PANTOPRAZOLE SODIUM 40 MILLIGRAM(S): 20 TABLET, DELAYED RELEASE ORAL at 12:32

## 2019-04-27 RX ADMIN — ENOXAPARIN SODIUM 40 MILLIGRAM(S): 100 INJECTION SUBCUTANEOUS at 12:33

## 2019-04-27 RX ADMIN — TAMSULOSIN HYDROCHLORIDE 0.4 MILLIGRAM(S): 0.4 CAPSULE ORAL at 22:16

## 2019-04-27 RX ADMIN — Medication 62.5 MILLIMOLE(S): at 10:40

## 2019-04-27 RX ADMIN — Medication 650 MILLIGRAM(S): at 17:07

## 2019-04-27 RX ADMIN — Medication 2 MILLIGRAM(S): at 00:58

## 2019-04-27 RX ADMIN — Medication 650 MILLIGRAM(S): at 12:32

## 2019-04-27 RX ADMIN — SIMVASTATIN 20 MILLIGRAM(S): 20 TABLET, FILM COATED ORAL at 22:16

## 2019-04-27 RX ADMIN — Medication 650 MILLIGRAM(S): at 17:37

## 2019-04-27 RX ADMIN — Medication 2 MILLIGRAM(S): at 12:32

## 2019-04-27 RX ADMIN — Medication 50 MILLIGRAM(S): at 06:29

## 2019-04-27 RX ADMIN — Medication 1000 UNIT(S): at 12:32

## 2019-04-27 RX ADMIN — Medication 650 MILLIGRAM(S): at 13:02

## 2019-04-27 RX ADMIN — Medication 1 GRAM(S): at 17:07

## 2019-04-27 RX ADMIN — Medication 2 MILLIGRAM(S): at 06:29

## 2019-04-27 RX ADMIN — Medication 50 GRAM(S): at 10:40

## 2019-04-27 RX ADMIN — Medication 2 MILLIGRAM(S): at 17:07

## 2019-04-27 RX ADMIN — Medication 20 MILLIGRAM(S): at 22:16

## 2019-04-27 NOTE — PROGRESS NOTE ADULT - SUBJECTIVE AND OBJECTIVE BOX
Subjective: Patient is a 83y old   very ill Male who presents with a chief complaint of "I have a blockage in my colon."pt with hx ca colon many years ago colon perforation several months ago along with partial appendectomy now found to have complete obstruction at sigmoid level  with liver mets  Day 4 post op illeostomy stent placement had episode of sinus tachycardia  pulse controlled overnight on increased b blockers  probably was dry     PSH  Aortic stenosis  Other specified diseases of appendix: appendiceal lesion  Cancer of colon: 2001 - malignant sigmoid polyp  CAD (coronary artery disease)  HLD (hyperlipidemia)  Hypertension  S/P cataract surgery: bilateral  History of prostate surgery: Greenlight laser of prostate  History of colon resection: 2001 - malignant sigmoid polyp  History of appendectomy: 3/2019 - per pt, a portion of the appendix was not removed  Stented coronary artery: 11/14/2014 - NAI to CX      Allergies    No Known Allergies    Intolerances    oxycodone (Other)  OxyContin (Other)      MEDICATIONS  (STANDING):  cefoTEtan  IVPB 2 Gram(s) IV Intermittent once    MEDICATIONS  (PRN):      CONSTITUTIONAL: No fever,  50lbweight loss, fatigue  EYES: No eye pain, visual disturbances, or discharge  ENMT:  No difficulty hearing, tinnitus, vertigo; No sinus or throat pain  NECK: No pain or stiffness  BREASTS: No pain, masses, or nipple discharge  RESPIRATORY: No cough, wheezing, chills or hemoptysis; No shortness of breath  CARDIOVASCULAR: No chest pain, palpitations, dizziness, or leg swelling  GASTROINTESTINAL:abdominal pain distension no bms GENITOURINARY: No dysuria, frequency, hematuria, or incontinence  NEUROLOGICAL: No headaches, memory loss, loss of strength, numbness, or tremors  SKIN: No itching, burning, rashes, or lesions   LYMPH NODES: No enlarged glands  ENDOCRINE: No heat or cold intolerance; No hair loss  MUSCULOSKELETAL: No joint pain or swelling; No muscle, back, or extremity pain  PSYCHIATRIC: No depression, anxiety, mood swings, or difficulty sleeping  HEME/LYMPH: No easy bruising, or bleeding gums  ALLERY AND IMMUNOLOGIC: No hives or eczema      PHYSICAL EXAM:    GENERAL: Comfortable, no acute distress   bp 118/90 pulse 88 occ irreg beats    HEAD:  Normocephalic, atraumatic  EYES: EOMI, PERRLA  HEENT: Moist mucous membranes  NECK: Supple, No JVD  NERVOUS SYSTEM:  Alert & Oriented X3, Motor Strength 5/5 B/L upper and lower extremities  CHEST/LUNG: Clear to auscultation bilaterally  HEART: Regular rate and rhythm  ABDOMEN distended dressed illeostomy  stent with drainage   rectum GENITOURINARY: indwelling tony   EXTREMITIES:   No clubbing, cyanosis, or edema  MUSCULOSKELTAL- No muscle tenderness, no joint tenderness  SKIN-no rash       Complete Blood Count (04.26.19 @ 12:02)    WBC Count: 8.5 K/uL    RBC Count: 3.30 M/uL    Hemoglobin: 10.5 g/dL    Hematocrit: 30.7 %    Mean Cell Volume: 93.1 fl    Mean Cell Hemoglobin: 31.9 pg    Mean Cell Hemoglobin Conc: 34.3 gm/dL    Red Cell Distrib Width: 12.6 %    Platelet Count - Automated: 210 K/uL      Basic Metabolic Panel (04.26.19 @ 07:13)    Sodium, Serum: 134 mmol/L    Potassium, Serum: 3.8 mmol/L    Chloride, Serum: 104 mmol/L    Carbon Dioxide, Serum: 20 mmol/L    Anion Gap, Serum: 10 mmol/L    Blood Urea Nitrogen, Serum: 14 mg/dL    Creatinine, Serum: 0.89 mg/dL    Glucose, Serum: 130 mg/dL    Calcium, Total Serum: 8.7 mg/dL    eGFR if Non : 79: Interpretative comment  The units for eGFR are mL/min/1.73M2 (normalized body surface area). The  eGFR is calculated from a serum creatinine using the CKD-EPI equation.  Other variables required for calculation are race, age and sex. Among  patients with chronic kidney disease (CKD), the eGFR is useful in  determining the stage of disease according to KDOQI CKD classification.  All eGFR results are reported numerically with the following  interpretation.          GFR                    With                 Without     (ml/min/1.73 m2)    Kidney Damage       Kidney Damage        >= 90                    Stage 1                     Normal        60-89                    Stage 2                     Decreased GFR        30-59     Stage 3                     Stage 3        15-29                    Stage 4                     Stage 4        < 15                      Stage 5                     Stage 5  Each stage of CKD assumes that the associated GFR level has been in  effect for at least 3 months. Determination of stages one and two (with  eGFR > 59 ml/min/m2) requires estimation of kidney damage for at least 3  months as defined by structural or functional abnormalities.  Limitations: All estimates of GFR will be less accurate for patients at  extremes of muscle mass (including but not limited to frail elderly,  critically ill, or cancer patients), those with unusual diets, and those  with conditions associated with reduced secretion or extrarenal  elimination of creatinine. The eGFR equation is not recommended for use  in patients with unstable creatinine levels. mL/min/1.73M2    eGFR if African American: 92 mL/min/1.73M2                                   EKG:< from: 12 Lead ECG (04.25.19 @ 08:34) >  Ventricular Rate 128 BPM    Atrial Rate 128 BPM    P-R Interval 178 ms    QRS Duration 80 ms    Q-T Interval 278 ms    QTC Calculation(Bezet) 405 ms    P Axis 51 degrees    R Axis -6 degrees    T Axis 6 degrees    Diagnosis Line SINUS TACHYCARDIA WITH OCCASIONAL PREMATURE VENTRICULAR COMPLEXES  NONSPECIFIC T WAVE ABNORMALITY  ABNORMAL ECG          Imaging Studies: obstructing mass on virtual colonoscopy and colonscopy       Impression / Plan:

## 2019-04-27 NOTE — PROGRESS NOTE ADULT - PROBLEM SELECTOR PLAN 1
day 4 post op colon resection illeostomy stent placement dvt prophylaxis surg protocol followed by gi for stent check next week discharge today

## 2019-04-27 NOTE — PROGRESS NOTE ADULT - ASSESSMENT
pt with high bp hyperlipidemia with colon cancer with mets and obstructing lesion sigmoid colon day4 post op illeostomy and stent placement  oob to chair  advance diet as tolerated dvt prophylaxis surg protocol  monitor pulse  increased b blockers  card consult  and echo if heart rate  continues to go over 100  diet advanced tolerating well  no signs of PE or Dvt  appreciate card iology consult

## 2019-04-27 NOTE — PROGRESS NOTE ADULT - SUBJECTIVE AND OBJECTIVE BOX
Surgery Red Team Daily Progress Note   ARTHUR ANGELES | [id number]  --------------------------------------------------------------------------------------------------------------------  SUBJECTIVE / 24H EVENTS  Patient seen and examined on morning rounds. No acute events overnight. Ileostomy output improved w/ medication.  --------------------------------------------------------------------------------------------------------------------  OBJECTIVE:    VITAL SIGNS:  T(C): 36.9 (19 @ 01:18), Max: 36.9 (19 @ 01:18)  HR: 88 (19 01:18) (64 - 101)  BP: 135/70 (19 01:18) (100/59 - 135/70)  RR: 18 (19 01:18) (16 - 18)  SpO2: 97% (19 01:18) (97% - 99%)  Daily Weight in k.3 (2019 10:27)      PHYSICAL EXAM:  Gen: NAD, resting in bed, alert and responding appropriately  Resp: Airway patent, non-labored respirations  Abd: Soft, ND, NT, incision dressed d/i, ileostomy viable w/ fxn  Ext: WWP      19 @ 07:01  -  19 @ 07:00  --------------------------------------------------------  IN:    dextrose 5% + sodium chloride 0.9%: 1800 mL    Lactated Ringers IV Bolus: 1000 mL    Oral Fluid: 1322 mL    Solution: 750 mL  Total IN: 4872 mL    OUT:    Ileostomy: 5750 mL    Indwelling Catheter - Urethral: 850 mL  Total OUT: 6600 mL    Total NET: -1728 mL      19 @ 07:01  -  19 @ 05:49  --------------------------------------------------------  IN:    Oral Fluid: 540 mL  Total IN: 540 mL    OUT:    Ileostomy: 850 mL    Indwelling Catheter - Urethral: 950 mL    Voided: 700 mL  Total OUT: 2500 mL    Total NET: -1960 mL    LAB VALUES:      134<L>  |  104  |  14  ----------------------------<  130<H>  3.8   |  20<L>  |  0.89    Ca    8.7      2019 07:13  Phos  2.1       Mg     1.9                                      10.5   8.5   )-----------( 210      ( 2019 12:02 )             30.7     CARDIAC MARKERS ( 2019 10:19 )  x     / x     / 148 U/L / x     / 1.5 ng/mL      MICROBIOLOGY:    No new microbiology data for review.     RADIOLOGY:    No new radiographic images for review.    MEDICATIONS  (STANDING):  acetaminophen   Tablet .. 650 milliGRAM(s) Oral every 6 hours  cholecalciferol 1000 Unit(s) Oral daily  doxepin 20 milliGRAM(s) Oral at bedtime  enoxaparin Injectable 40 milliGRAM(s) SubCutaneous daily  loperamide 2 milliGRAM(s) Oral four times a day  metoprolol succinate ER 50 milliGRAM(s) Oral daily  pantoprazole   Suspension 40 milliGRAM(s) Oral daily  psyllium Powder 1 Packet(s) Oral three times a day  simvastatin 20 milliGRAM(s) Oral at bedtime  tamsulosin 0.4 milliGRAM(s) Oral at bedtime    MEDICATIONS  (PRN):  benzocaine 15 mG/menthol 3.6 mG Lozenge 1 Lozenge Oral every 6 hours PRN Sore Throat  benzocaine 15 mG/menthol 3.6 mG Lozenge 1 Lozenge Oral every 3 hours PRN Sore Throat  HYDROmorphone   Tablet 2 milliGRAM(s) Oral every 4 hours PRN Moderate Pain (4 - 6)  HYDROmorphone   Tablet 4 milliGRAM(s) Oral every 4 hours PRN Severe Pain (7 - 10)

## 2019-04-27 NOTE — PROGRESS NOTE ADULT - ASSESSMENT
83M hx colon cancer s/p resection (2001) who had a recurrence and is now POD#4 s/p b/l ucath placement, ex-lap, intraoperative rectal stent placement by GI, loop ileostomy creation (unresectable mass) (4/23). Patient hemodynamically stable w/ po tolerance, pain control and appropriate ileostomy output.     - Pain control  - Monitor HR   - LRD,   - DVT ppx: Lovenox, OOBA  - anticipate d/c home today.     General Surgery, [facility]  Pager: [phone]

## 2019-04-28 LAB
ANION GAP SERPL CALC-SCNC: 12 MMOL/L — SIGNIFICANT CHANGE UP (ref 5–17)
BUN SERPL-MCNC: 9 MG/DL — SIGNIFICANT CHANGE UP (ref 7–23)
CALCIUM SERPL-MCNC: 9 MG/DL — SIGNIFICANT CHANGE UP (ref 8.4–10.5)
CHLORIDE SERPL-SCNC: 104 MMOL/L — SIGNIFICANT CHANGE UP (ref 96–108)
CO2 SERPL-SCNC: 22 MMOL/L — SIGNIFICANT CHANGE UP (ref 22–31)
CREAT SERPL-MCNC: 0.83 MG/DL — SIGNIFICANT CHANGE UP (ref 0.5–1.3)
GLUCOSE SERPL-MCNC: 91 MG/DL — SIGNIFICANT CHANGE UP (ref 70–99)
HCT VFR BLD CALC: 27.5 % — LOW (ref 39–50)
HGB BLD-MCNC: 9.2 G/DL — LOW (ref 13–17)
MCHC RBC-ENTMCNC: 30.9 PG — SIGNIFICANT CHANGE UP (ref 27–34)
MCHC RBC-ENTMCNC: 33.3 GM/DL — SIGNIFICANT CHANGE UP (ref 32–36)
MCV RBC AUTO: 92.8 FL — SIGNIFICANT CHANGE UP (ref 80–100)
PLATELET # BLD AUTO: 235 K/UL — SIGNIFICANT CHANGE UP (ref 150–400)
POTASSIUM SERPL-MCNC: 3.8 MMOL/L — SIGNIFICANT CHANGE UP (ref 3.5–5.3)
POTASSIUM SERPL-SCNC: 3.8 MMOL/L — SIGNIFICANT CHANGE UP (ref 3.5–5.3)
RBC # BLD: 2.96 M/UL — LOW (ref 4.2–5.8)
RBC # FLD: 12.6 % — SIGNIFICANT CHANGE UP (ref 10.3–14.5)
SODIUM SERPL-SCNC: 138 MMOL/L — SIGNIFICANT CHANGE UP (ref 135–145)
WBC # BLD: 6.4 K/UL — SIGNIFICANT CHANGE UP (ref 3.8–10.5)
WBC # FLD AUTO: 6.4 K/UL — SIGNIFICANT CHANGE UP (ref 3.8–10.5)

## 2019-04-28 RX ORDER — DIPHENOXYLATE HCL/ATROPINE 2.5-.025MG
1 TABLET ORAL
Qty: 0 | Refills: 0 | Status: DISCONTINUED | OUTPATIENT
Start: 2019-04-28 | End: 2019-04-29

## 2019-04-28 RX ADMIN — SIMVASTATIN 20 MILLIGRAM(S): 20 TABLET, FILM COATED ORAL at 21:21

## 2019-04-28 RX ADMIN — Medication 1 TABLET(S): at 17:46

## 2019-04-28 RX ADMIN — TAMSULOSIN HYDROCHLORIDE 0.4 MILLIGRAM(S): 0.4 CAPSULE ORAL at 21:21

## 2019-04-28 RX ADMIN — Medication 2 MILLIGRAM(S): at 00:30

## 2019-04-28 RX ADMIN — Medication 650 MILLIGRAM(S): at 05:21

## 2019-04-28 RX ADMIN — Medication 2 MILLIGRAM(S): at 17:46

## 2019-04-28 RX ADMIN — Medication 2 MILLIGRAM(S): at 05:21

## 2019-04-28 RX ADMIN — Medication 650 MILLIGRAM(S): at 05:51

## 2019-04-28 RX ADMIN — Medication 1 TABLET(S): at 10:52

## 2019-04-28 RX ADMIN — Medication 1 GRAM(S): at 17:47

## 2019-04-28 RX ADMIN — Medication 50 MILLIGRAM(S): at 05:21

## 2019-04-28 RX ADMIN — Medication 1 TABLET(S): at 23:02

## 2019-04-28 RX ADMIN — Medication 650 MILLIGRAM(S): at 00:30

## 2019-04-28 RX ADMIN — Medication 650 MILLIGRAM(S): at 17:46

## 2019-04-28 RX ADMIN — Medication 650 MILLIGRAM(S): at 12:18

## 2019-04-28 RX ADMIN — Medication 1000 UNIT(S): at 12:18

## 2019-04-28 RX ADMIN — Medication 1 GRAM(S): at 00:30

## 2019-04-28 RX ADMIN — Medication 650 MILLIGRAM(S): at 18:16

## 2019-04-28 RX ADMIN — Medication 650 MILLIGRAM(S): at 23:03

## 2019-04-28 RX ADMIN — ENOXAPARIN SODIUM 40 MILLIGRAM(S): 100 INJECTION SUBCUTANEOUS at 12:18

## 2019-04-28 RX ADMIN — Medication 2 MILLIGRAM(S): at 23:02

## 2019-04-28 RX ADMIN — Medication 650 MILLIGRAM(S): at 12:48

## 2019-04-28 RX ADMIN — Medication 1 GRAM(S): at 05:21

## 2019-04-28 RX ADMIN — Medication 650 MILLIGRAM(S): at 01:00

## 2019-04-28 RX ADMIN — PANTOPRAZOLE SODIUM 40 MILLIGRAM(S): 20 TABLET, DELAYED RELEASE ORAL at 12:18

## 2019-04-28 RX ADMIN — Medication 1 GRAM(S): at 12:17

## 2019-04-28 RX ADMIN — Medication 20 MILLIGRAM(S): at 23:02

## 2019-04-28 RX ADMIN — Medication 650 MILLIGRAM(S): at 23:33

## 2019-04-28 RX ADMIN — Medication 2 MILLIGRAM(S): at 12:18

## 2019-04-28 RX ADMIN — Medication 1 GRAM(S): at 23:02

## 2019-04-28 NOTE — PROGRESS NOTE ADULT - SUBJECTIVE AND OBJECTIVE BOX
Subjective: Patient is a 83y old   very ill Male who presents with a chief complaint of "I have a blockage in my colon."pt with hx ca colon many years ago colon perforation several months ago along with partial appendectomy now found to have complete obstruction at sigmoid level  with liver mets  Day5 post op illeostomy stent placement had episode of sinus tachycardia  pulse controlled overnight on increased b blockers  probably was dry  lso with heartbyrn     PSH  Aortic stenosis  Other specified diseases of appendix: appendiceal lesion  Cancer of colon: 2001 - malignant sigmoid polyp  CAD (coronary artery disease)  HLD (hyperlipidemia)  Hypertension  S/P cataract surgery: bilateral  History of prostate surgery: Greenlight laser of prostate  History of colon resection: 2001 - malignant sigmoid polyp  History of appendectomy: 3/2019 - per pt, a portion of the appendix was not removed  Stented coronary artery: 11/14/2014 - NAI to CX      Allergies    No Known Allergies    Intolerances    oxycodone (Other)  OxyContin (Other)      MEDICATIONS  (STANDING):  cefoTEtan  IVPB 2 Gram(s) IV Intermittent once    MEDICATIONS  (PRN):      CONSTITUTIONAL: No fever,  50lbweight loss, fatigue  EYES: No eye pain, visual disturbances, or discharge  ENMT:  No difficulty hearing, tinnitus, vertigo; No sinus or throat pain  NECK: No pain or stiffness  BREASTS: No pain, masses, or nipple discharge  RESPIRATORY: No cough, wheezing, chills or hemoptysis; No shortness of breath  CARDIOVASCULAR: No chest pain, palpitations, dizziness, or leg swelling  GASTROINTESTINAL:abdominal pain distension no bms GENITOURINARY: No dysuria, frequency, hematuria, or incontinence  NEUROLOGICAL: No headaches, memory loss, loss of strength, numbness, or tremors  SKIN: No itching, burning, rashes, or lesions   LYMPH NODES: No enlarged glands  ENDOCRINE: No heat or cold intolerance; No hair loss  MUSCULOSKELETAL: No joint pain or swelling; No muscle, back, or extremity pain  PSYCHIATRIC: No depression, anxiety, mood swings, or difficulty sleeping  HEME/LYMPH: No easy bruising, or bleeding gums  ALLERY AND IMMUNOLOGIC: No hives or eczema      PHYSICAL EXAM:    GENERAL: Comfortable, no acute distress   bp 118/90 pulse 88 occ irreg beats    HEAD:  Normocephalic, atraumatic  EYES: EOMI, PERRLA  HEENT: Moist mucous membranes  NECK: Supple, No JVD  NERVOUS SYSTEM:  Alert & Oriented X3, Motor Strength 5/5 B/L upper and lower extremities  CHEST/LUNG: Clear to auscultation bilaterally  HEART: Regular rate and rhythm  ABDOMEN distended dressed illeostomy  stent with drainage   rectum GENITOURINARY: indwelling tony   EXTREMITIES:   No clubbing, cyanosis, or edema  MUSCULOSKELTAL- No muscle tenderness, no joint tenderness  SKIN-no rash    Complete Blood Count (04.27.19 @ 23:30)    WBC Count: 7.37 K/uL    RBC Count: 2.91 M/uL    Hemoglobin: 8.6 g/dL    Hematocrit: 27.2 %    Mean Cell Volume: 93.5 fl    Mean Cell Hemoglobin: 29.6 pg    Mean Cell Hemoglobin Conc: 31.6 gm/dL    Red Cell Distrib Width: 13.4 %    Platelet Count - Automated: 241 K/uL  Basic Metabolic Panel (04.27.19 @ 07:01)    Sodium, Serum: 137 mmol/L    Potassium, Serum: 4.2 mmol/L    Chloride, Serum: 107 mmol/L    Carbon Dioxide, Serum: 21 mmol/L    Anion Gap, Serum: 9 mmol/L    Blood Urea Nitrogen, Serum: 9 mg/dL    Creatinine, Serum: 0.83 mg/dL    Glucose, Serum: 97 mg/dL    Calcium, Total Serum: 8.9 mg/dL    eGFR if Non : 81: Interpretative comment  The units for eGFR are mL/min/1.73M2 (normalized body surface area). The  eGFR is calculated from a serum creatinine using the CKD-EPI equation.  Other variables required for calculation are race, age and sex. Among  patients with chronic kidney disease (CKD), the eGFR is useful in  determining the stage of disease according to KDOQI CKD classification.  All eGFR results are reported numerically with the following  interpretation.          GFR                    With                 Without     (ml/min/1.73 m2)    Kidney Damage       Kidney Damage        >= 90                    Stage 1                     Normal        60-89                    Stage 2                     Decreased GFR        30-59     Stage 3                     Stage 3        15-29                    Stage 4                     Stage 4        < 15                      Stage 5                     Stage 5  Each stage of CKD assumes that the associated GFR level has been in  effect for at least 3 months. Determination of stages one and two (with  eGFR > 59 ml/min/m2) requires estimation of kidney damage for at least 3  months as defined by structural or functional abnormalities.  Limitations: All estimates of GFR will be less accurate for patients at  extremes of muscle mass (including but not limited to frail elderly,  critically ill, or cancer patients), those with unusual diets, and those  with conditions associated with reduced secretion or extrarenal  elimination of creatinine. The eGFR equation is not recommended for use  in patients with unstable creatinine levels. mL/min/1.73M2    eGFR if African American: 94 mL/min/1.73M2         Complete Blood Count (04.26.19 @ 12:02)    WBC Count: 8.5 K/uL    RBC Count: 3.30 M/uL    Hemoglobin: 10.5 g/dL    Hematocrit: 30.7 %    Mean Cell Volume: 93.1 fl    Mean Cell Hemoglobin: 31.9 pg    Mean Cell Hemoglobin Conc: 34.3 gm/dL    Red Cell Distrib Width: 12.6 %    Platelet Count - Automated: 210 K/uL      Basic Metabolic Panel (04.26.19 @ 07:13)    Sodium, Serum: 134 mmol/L    Potassium, Serum: 3.8 mmol/L    Chloride, Serum: 104 mmol/L    Carbon Dioxide, Serum: 20 mmol/L    Anion Gap, Serum: 10 mmol/L    Blood Urea Nitrogen, Serum: 14 mg/dL    Creatinine, Serum: 0.89 mg/dL    Glucose, Serum: 130 mg/dL    Calcium, Total Serum: 8.7 mg/dL    eGFR if Non : 79: Interpretative comment  The units for eGFR are mL/min/1.73M2 (normalized body surface area). The  eGFR is calculated from a serum creatinine using the CKD-EPI equation.  Other variables required for calculation are race, age and sex. Among  patients with chronic kidney disease (CKD), the eGFR is useful in  determining the stage of disease according to KDOQI CKD classification.  All eGFR results are reported numerically with the following  interpretation.          GFR                    With                 Without     (ml/min/1.73 m2)    Kidney Damage       Kidney Damage        >= 90                    Stage 1                     Normal        60-89                    Stage 2                     Decreased GFR        30-59     Stage 3                     Stage 3        15-29                    Stage 4                     Stage 4        < 15                      Stage 5                     Stage 5  Each stage of CKD assumes that the associated GFR level has been in  effect for at least 3 months. Determination of stages one and two (with  eGFR > 59 ml/min/m2) requires estimation of kidney damage for at least 3  months as defined by structural or functional abnormalities.  Limitations: All estimates of GFR will be less accurate for patients at  extremes of muscle mass (including but not limited to frail elderly,  critically ill, or cancer patients), those with unusual diets, and those  with conditions associated with reduced secretion or extrarenal  elimination of creatinine. The eGFR equation is not recommended for use  in patients with unstable creatinine levels. mL/min/1.73M2    eGFR if African American: 92 mL/min/1.73M2                                   EKG:< from: 12 Lead ECG (04.25.19 @ 08:34) >  Ventricular Rate 128 BPM    Atrial Rate 128 BPM    P-R Interval 178 ms    QRS Duration 80 ms    Q-T Interval 278 ms    QTC Calculation(Bezet) 405 ms    P Axis 51 degrees    R Axis -6 degrees    T Axis 6 degrees    Diagnosis Line SINUS TACHYCARDIA WITH OCCASIONAL PREMATURE VENTRICULAR COMPLEXES  NONSPECIFIC T WAVE ABNORMALITY  ABNORMAL ECG          Imaging Studies: obstructing mass on virtual colonoscopy and colonscopy       Impression / Plan:

## 2019-04-28 NOTE — PROGRESS NOTE ADULT - PROBLEM SELECTOR PLAN 1
day 5 post op colon resection illeostomy stent placement dvt prophylaxis surg protocol followed by gi for stent check next week discharge today resume cancer treatment next week

## 2019-04-28 NOTE — PROGRESS NOTE ADULT - ASSESSMENT
pt with high bp hyperlipidemia with colon cancer with mets and obstructing lesion sigmoid colon day5 post op illeostomy and stent placement  oob to chair  advance diet as tolerated dvt prophylaxis surg protocol  monitor pulse  increased b blockers  card consult  and echo if heart rate  continues to go over 100  diet advanced tolerating well  no signs of PE or Dvt   carafate suspension for heartburn

## 2019-04-28 NOTE — PROGRESS NOTE ADULT - SUBJECTIVE AND OBJECTIVE BOX
Subjective: Patient seen and examined. No new events except as noted.   no cp or sob   No more sinus tachycardia  no palpitations  MEDICATIONS:  MEDICATIONS  (STANDING):  acetaminophen   Tablet .. 650 milliGRAM(s) Oral every 6 hours  cholecalciferol 1000 Unit(s) Oral daily  diphenoxylate/atropine 1 Tablet(s) Oral four times a day  doxepin 20 milliGRAM(s) Oral at bedtime  enoxaparin Injectable 40 milliGRAM(s) SubCutaneous daily  loperamide 2 milliGRAM(s) Oral four times a day  metoprolol succinate ER 50 milliGRAM(s) Oral daily  pantoprazole   Suspension 40 milliGRAM(s) Oral daily  simvastatin 20 milliGRAM(s) Oral at bedtime  sucralfate suspension 1 Gram(s) Oral four times a day  tamsulosin 0.4 milliGRAM(s) Oral at bedtime      PHYSICAL EXAM:  T(C): 36.8 (04-28-19 @ 09:00), Max: 37.2 (04-27-19 @ 13:19)  HR: 83 (04-28-19 @ 09:00) (72 - 99)  BP: 112/72 (04-28-19 @ 09:00) (111/64 - 145/72)  RR: 18 (04-28-19 @ 09:00) (18 - 18)  SpO2: 98% (04-28-19 @ 09:00) (96% - 99%)  Wt(kg): --  I&O's Summary    27 Apr 2019 07:01  -  28 Apr 2019 07:00  --------------------------------------------------------  IN: 1110 mL / OUT: 2775 mL / NET: -1665 mL    28 Apr 2019 07:01  -  28 Apr 2019 10:21  --------------------------------------------------------  IN: 240 mL / OUT: 0 mL / NET: 240 mL          Appearance: Normal dpressed NAD	  HEENT:   Normal oral mucosa, PERRL, EOMI	  Cardiovascular: Normal S1 S2, No JVD, No murmurs ,  Respiratory: Lungs clear to auscultation, normal effort 	  Ext: No edema      LABS:    CARDIAC MARKERS:                                9.2    6.4   )-----------( 235      ( 28 Apr 2019 07:42 )             27.5     04-28    138  |  104  |  9   ----------------------------<  91  3.8   |  22  |  0.83    Ca    9.0      28 Apr 2019 07:42  Phos  2.3     04-27  Mg     1.8     04-27      proBNP:   Lipid Profile:   HgA1c:   TSH:           TELEMETRY: 	    ECG:  NSR HR 85	  RADIOLOGY:

## 2019-04-28 NOTE — PROGRESS NOTE ADULT - ASSESSMENT
1. metastatic colon ca s/p resection cosostomy  2. sinus tachycardia resolved  3. no acute cardiac issues  4. hemoidynmaiclly stable    Recommend  reassured wife and patient that cardiac status stable  continue toprol xl 50  no further cardiac workup

## 2019-04-28 NOTE — PROGRESS NOTE ADULT - SUBJECTIVE AND OBJECTIVE BOX
Surgery Red Team Daily Progress Note   ARTHUR ANGELES | [id number]  --------------------------------------------------------------------------------------------------------------------  SUBJECTIVE / 24H EVENTS  Patient seen and examined on morning rounds. No acute events overnight. Ileostomy output remains <1500cc/24h. No nausea / vomiting. Tolerating diet. Ambulating with assistance.   --------------------------------------------------------------------------------------------------------------------  OBJECTIVE:    VITAL SIGNS:  T(C): 36.4 (04-28-19 @ 05:19), Max: 37.2 (04-27-19 @ 13:19)  HR: 99 (04-28-19 @ 05:19) (72 - 99)  BP: 125/70 (04-28-19 @ 05:19) (111/64 - 145/72)  RR: 18 (04-28-19 @ 05:19) (18 - 18)  SpO2: 97% (04-28-19 @ 05:19) (95% - 99%)      PHYSICAL EXAM:  Gen: NAD  LS: Respirations unlabored.  GI: Soft. Nontender. Nondistended. Incision c/d/i. Ostomy viable w/ +flatus. +BM.   Ext: Warm, well perfused      04-26-19 @ 07:01  -  04-27-19 @ 07:00  --------------------------------------------------------  IN:    Lactated Ringers IV Bolus: 150 mL    Oral Fluid: 540 mL  Total IN: 690 mL    OUT:    Ileostomy: 850 mL    Indwelling Catheter - Urethral: 950 mL    Voided: 700 mL  Total OUT: 2500 mL    Total NET: -1810 mL      04-27-19 @ 07:01  -  04-28-19 @ 05:45  --------------------------------------------------------  IN:    Lactated Ringers IV Bolus: 250 mL    Oral Fluid: 560 mL    Solution: 50 mL    Solution: 250 mL  Total IN: 1110 mL    OUT:    Ileostomy: 1300 mL    Voided: 975 mL  Total OUT: 2275 mL    Total NET: -1165 mL    LAB VALUES:  04-27    137  |  107  |  9   ----------------------------<  97  4.2   |  21<L>  |  0.83    Ca    8.9      27 Apr 2019 07:01  Phos  2.3     04-27  Mg     1.8     04-27                                 8.6    7.37  )-----------( 241      ( 27 Apr 2019 23:30 )             27.2         MICROBIOLOGY:    No new microbiology data for review.     RADIOLOGY:    No new radiographic images for review.    MEDICATIONS  (STANDING):  acetaminophen   Tablet .. 650 milliGRAM(s) Oral every 6 hours  cholecalciferol 1000 Unit(s) Oral daily  doxepin 20 milliGRAM(s) Oral at bedtime  enoxaparin Injectable 40 milliGRAM(s) SubCutaneous daily  loperamide 2 milliGRAM(s) Oral four times a day  metoprolol succinate ER 50 milliGRAM(s) Oral daily  pantoprazole   Suspension 40 milliGRAM(s) Oral daily  simvastatin 20 milliGRAM(s) Oral at bedtime  sucralfate suspension 1 Gram(s) Oral four times a day  tamsulosin 0.4 milliGRAM(s) Oral at bedtime    MEDICATIONS  (PRN):  benzocaine 15 mG/menthol 3.6 mG Lozenge 1 Lozenge Oral every 6 hours PRN Sore Throat  benzocaine 15 mG/menthol 3.6 mG Lozenge 1 Lozenge Oral every 3 hours PRN Sore Throat  HYDROmorphone   Tablet 2 milliGRAM(s) Oral every 4 hours PRN Moderate Pain (4 - 6)  HYDROmorphone   Tablet 4 milliGRAM(s) Oral every 4 hours PRN Severe Pain (7 - 10)

## 2019-04-28 NOTE — PROGRESS NOTE ADULT - ASSESSMENT
83M hx colon cancer s/p resection (2001) who had a recurrence and is now POD#5 s/p b/l ucath placement, ex-lap, intraoperative rectal stent placement by GI, loop ileostomy creation (unresectable mass) (4/23). Patient hemodynamically stable w/ po tolerance, pain control and appropriate ileostomy output.     - LRD,   - DVT ppx: Lovenox, OOBA  - anticipate d/c home today.     General Surgery, [facility]  Pager: [phone]

## 2019-04-29 ENCOUNTER — TRANSCRIPTION ENCOUNTER (OUTPATIENT)
Age: 84
End: 2019-04-29

## 2019-04-29 VITALS
TEMPERATURE: 98 F | OXYGEN SATURATION: 98 % | SYSTOLIC BLOOD PRESSURE: 102 MMHG | DIASTOLIC BLOOD PRESSURE: 56 MMHG | HEART RATE: 85 BPM | RESPIRATION RATE: 18 BRPM

## 2019-04-29 PROCEDURE — 85014 HEMATOCRIT: CPT

## 2019-04-29 PROCEDURE — 93005 ELECTROCARDIOGRAM TRACING: CPT

## 2019-04-29 PROCEDURE — 86900 BLOOD TYPING SEROLOGIC ABO: CPT

## 2019-04-29 PROCEDURE — 82803 BLOOD GASES ANY COMBINATION: CPT

## 2019-04-29 PROCEDURE — C1726: CPT

## 2019-04-29 PROCEDURE — 82435 ASSAY OF BLOOD CHLORIDE: CPT

## 2019-04-29 PROCEDURE — 84295 ASSAY OF SERUM SODIUM: CPT

## 2019-04-29 PROCEDURE — 82565 ASSAY OF CREATININE: CPT

## 2019-04-29 PROCEDURE — 84100 ASSAY OF PHOSPHORUS: CPT

## 2019-04-29 PROCEDURE — 84484 ASSAY OF TROPONIN QUANT: CPT

## 2019-04-29 PROCEDURE — 84132 ASSAY OF SERUM POTASSIUM: CPT

## 2019-04-29 PROCEDURE — 80048 BASIC METABOLIC PNL TOTAL CA: CPT

## 2019-04-29 PROCEDURE — 82947 ASSAY GLUCOSE BLOOD QUANT: CPT

## 2019-04-29 PROCEDURE — 97161 PT EVAL LOW COMPLEX 20 MIN: CPT

## 2019-04-29 PROCEDURE — 83605 ASSAY OF LACTIC ACID: CPT

## 2019-04-29 PROCEDURE — 85027 COMPLETE CBC AUTOMATED: CPT

## 2019-04-29 PROCEDURE — 82553 CREATINE MB FRACTION: CPT

## 2019-04-29 PROCEDURE — 83735 ASSAY OF MAGNESIUM: CPT

## 2019-04-29 PROCEDURE — 71045 X-RAY EXAM CHEST 1 VIEW: CPT

## 2019-04-29 PROCEDURE — 86850 RBC ANTIBODY SCREEN: CPT

## 2019-04-29 PROCEDURE — 86901 BLOOD TYPING SEROLOGIC RH(D): CPT

## 2019-04-29 PROCEDURE — C1889: CPT

## 2019-04-29 PROCEDURE — 82962 GLUCOSE BLOOD TEST: CPT

## 2019-04-29 PROCEDURE — 82550 ASSAY OF CK (CPK): CPT

## 2019-04-29 PROCEDURE — C1758: CPT

## 2019-04-29 PROCEDURE — C1876: CPT

## 2019-04-29 PROCEDURE — C1769: CPT

## 2019-04-29 PROCEDURE — 82330 ASSAY OF CALCIUM: CPT

## 2019-04-29 RX ORDER — METOPROLOL TARTRATE 50 MG
1 TABLET ORAL
Qty: 0 | Refills: 0 | COMMUNITY

## 2019-04-29 RX ORDER — PANTOPRAZOLE SODIUM 20 MG/1
1 TABLET, DELAYED RELEASE ORAL
Qty: 30 | Refills: 0 | OUTPATIENT
Start: 2019-04-29 | End: 2019-05-28

## 2019-04-29 RX ORDER — METOPROLOL TARTRATE 50 MG
1 TABLET ORAL
Qty: 0 | Refills: 0 | COMMUNITY
Start: 2019-04-29

## 2019-04-29 RX ORDER — DIPHENOXYLATE HCL/ATROPINE 2.5-.025MG
1 TABLET ORAL
Qty: 56 | Refills: 0 | OUTPATIENT
Start: 2019-04-29 | End: 2019-05-12

## 2019-04-29 RX ORDER — LOPERAMIDE HCL 2 MG
2 TABLET ORAL
Qty: 112 | Refills: 0 | OUTPATIENT
Start: 2019-04-29 | End: 2019-05-12

## 2019-04-29 RX ORDER — DOXEPIN HCL 100 MG
2 CAPSULE ORAL
Qty: 0 | Refills: 0 | COMMUNITY
Start: 2019-04-29

## 2019-04-29 RX ADMIN — PANTOPRAZOLE SODIUM 40 MILLIGRAM(S): 20 TABLET, DELAYED RELEASE ORAL at 11:26

## 2019-04-29 RX ADMIN — ENOXAPARIN SODIUM 40 MILLIGRAM(S): 100 INJECTION SUBCUTANEOUS at 11:24

## 2019-04-29 RX ADMIN — Medication 650 MILLIGRAM(S): at 05:21

## 2019-04-29 RX ADMIN — Medication 2 MILLIGRAM(S): at 11:25

## 2019-04-29 RX ADMIN — Medication 50 MILLIGRAM(S): at 05:23

## 2019-04-29 RX ADMIN — Medication 1000 UNIT(S): at 11:24

## 2019-04-29 RX ADMIN — Medication 650 MILLIGRAM(S): at 05:51

## 2019-04-29 RX ADMIN — Medication 2 MILLIGRAM(S): at 05:23

## 2019-04-29 RX ADMIN — Medication 1 GRAM(S): at 05:23

## 2019-04-29 RX ADMIN — Medication 1 TABLET(S): at 05:20

## 2019-04-29 RX ADMIN — Medication 650 MILLIGRAM(S): at 11:23

## 2019-04-29 RX ADMIN — Medication 1 GRAM(S): at 11:27

## 2019-04-29 RX ADMIN — Medication 1 TABLET(S): at 11:29

## 2019-04-29 RX ADMIN — Medication 650 MILLIGRAM(S): at 11:30

## 2019-04-29 NOTE — PROGRESS NOTE ADULT - ASSESSMENT
pt with high bp hyperlipidemia with colon cancer with mets and obstructing lesion sigmoid colon day5 post op illeostomy and stent placement  oob to chair  advance diet as tolerated dvt prophylaxis surg protocol  monitor pulse  increased b blockers  card consult  and echo if heart rate  continues to go over 100  diet advanced tolerating well  no signs of PE or Dvt   carafate suspension for heartburn  home today

## 2019-04-29 NOTE — DISCHARGE NOTE NURSING/CASE MANAGEMENT/SOCIAL WORK - NSDCDPATPORTLINK_GEN_ALL_CORE
You can access the [facility] Patient Portal, offered by [facility], by registering with the following website: [email or web address]

## 2019-04-29 NOTE — PROGRESS NOTE ADULT - ASSESSMENT
83M hx colon cancer s/p resection (2001) who had a recurrence and is now POD#6 s/p b/l ucath placement, ex-lap, intraoperative rectal stent placement by GI, loop ileostomy creation (unresectable mass) (4/23). Patient hemodynamically stable w/ po tolerance, pain control and appropriate ileostomy output. Anticipate discharge home today.     - LRD,   - DVT ppx: Lovenox, OOBA  - anticipate d/c home today.     General Surgery, [facility]  Pager: [phone]

## 2019-04-29 NOTE — PROGRESS NOTE ADULT - SUBJECTIVE AND OBJECTIVE BOX
Surgery Red Team Daily Progress Note   ARTHUR ANGELES | [id number]  --------------------------------------------------------------------------------------------------------------------  SUBJECTIVE / 24H EVENTS  Patient seen and examined on morning rounds. No acute events overnight.  Pain controlled. No nausea / vomiting. PO tolerance improving. Ostomy output decreased, remains +flatus. +BM via appliance.   --------------------------------------------------------------------------------------------------------------------  OBJECTIVE:    VITAL SIGNS:  T(C): 37.3 (04-28-19 @ 21:21), Max: 37.3 (04-28-19 @ 21:21)  HR: 80 (04-28-19 @ 21:21) (80 - 99)  BP: 127/69 (04-28-19 @ 21:21) (101/50 - 145/72)  RR: 18 (04-28-19 @ 21:21) (18 - 18)  SpO2: 98% (04-28-19 @ 21:21) (96% - 100%)    PHYSICAL EXAM:  Gen: NAD  LS: Respirations unlabored.  GI: Soft. Appropriately tender. Nondistended. Incision c/d/i.   Ext: Warm, well perfused      04-27-19 @ 07:01  -  04-28-19 @ 07:00  --------------------------------------------------------  IN:    Lactated Ringers IV Bolus: 250 mL    Oral Fluid: 560 mL    Solution: 50 mL    Solution: 250 mL  Total IN: 1110 mL    OUT:    Ileostomy: 1300 mL    Voided: 1475 mL  Total OUT: 2775 mL    Total NET: -1665 mL      04-28-19 @ 07:01  -  04-29-19 @ 00:07  --------------------------------------------------------  IN:    Lactated Ringers IV Bolus: 125 mL    Oral Fluid: 1140 mL  Total IN: 1265 mL    OUT:    Ileostomy: 310 mL    Voided: 1450 mL  Total OUT: 1760 mL    Total NET: -495 mL      LAB VALUES:  04-28    138  |  104  |  9   ----------------------------<  91  3.8   |  22  |  0.83    Ca    9.0      28 Apr 2019 07:42  Phos  2.3     04-27  Mg     1.8     04-27                                 9.2    6.4   )-----------( 235      ( 28 Apr 2019 07:42 )             27.5       MICROBIOLOGY:    No new microbiology data for review.     RADIOLOGY:    No new radiographic images for review.    MEDICATIONS  (STANDING):  acetaminophen   Tablet .. 650 milliGRAM(s) Oral every 6 hours  cholecalciferol 1000 Unit(s) Oral daily  diphenoxylate/atropine 1 Tablet(s) Oral four times a day  doxepin 20 milliGRAM(s) Oral at bedtime  enoxaparin Injectable 40 milliGRAM(s) SubCutaneous daily  loperamide 2 milliGRAM(s) Oral four times a day  metoprolol succinate ER 50 milliGRAM(s) Oral daily  pantoprazole   Suspension 40 milliGRAM(s) Oral daily  simvastatin 20 milliGRAM(s) Oral at bedtime  sucralfate suspension 1 Gram(s) Oral four times a day  tamsulosin 0.4 milliGRAM(s) Oral at bedtime    MEDICATIONS  (PRN):  benzocaine 15 mG/menthol 3.6 mG Lozenge 1 Lozenge Oral every 6 hours PRN Sore Throat  benzocaine 15 mG/menthol 3.6 mG Lozenge 1 Lozenge Oral every 3 hours PRN Sore Throat  HYDROmorphone   Tablet 2 milliGRAM(s) Oral every 4 hours PRN Moderate Pain (4 - 6)  HYDROmorphone   Tablet 4 milliGRAM(s) Oral every 4 hours PRN Severe Pain (7 - 10)

## 2019-04-29 NOTE — PROGRESS NOTE ADULT - ATTENDING COMMENTS
High ileostomy output largely resolved   -Monitor output for 24 hrs.  If persistently less than 1L will dc in the am  -OOB  -diet as tolerated
Ileostomy [id number]  - start lomotil  - monitor I&Os  -Gatorade  -OOB
pt feels better today, still c/o incisional tenderness, no SOB  episode of tachy this am, sinus on EKG, responsive to beta blockers, BP stable    aaox3  abd soft, tender at incision, dressing c/d/i, ileostomy pink    advance diet  OOB  pain control  cardiology to eval tachycardia
ostomy output better controlled  d/c home on imodium  f/u with Dr. Swain next week
excessively high ileostomy output yesterday > 5Liters, stephanie diet    aaox3  abd soft, ileostomy pink, output slightly thicker today    started imodium  monitor fluid status  watch output overnight  d/c planning once output controlled

## 2019-04-29 NOTE — PROGRESS NOTE ADULT - REASON FOR ADMISSION
Rectal Mass
tachycardia
ca colon sinus tach
colon ca stent illeostomy metast ca
colon cancer  heartburn sinus tach
colon cancer with mets status post resection
colon cancer with mets tachycardia
metastatic colon cancer day 3 post op illeostomy and stent episode of sinus tach yesterday
obstruction colon cancer  metastasis

## 2019-04-29 NOTE — PROGRESS NOTE ADULT - SUBJECTIVE AND OBJECTIVE BOX
Subjective: Patient is a 83y old   very ill Male who presents with a chief complaint of "I have a blockage in my colon."pt with hx ca colon many years ago colon perforation several months ago along with partial appendectomy now found to have complete obstruction at sigmoid level  with liver mets  Day6 post op illeostomy stent placement had episode of sinus tachycardia  pulse controlled on increased b blockers  probably was dry  also with heartbyrn  home today     PSH  Aortic stenosis  Other specified diseases of appendix: appendiceal lesion  Cancer of colon: 2001 - malignant sigmoid polyp  CAD (coronary artery disease)  HLD (hyperlipidemia)  Hypertension  S/P cataract surgery: bilateral  History of prostate surgery: Greenlight laser of prostate  History of colon resection: 2001 - malignant sigmoid polyp  History of appendectomy: 3/2019 - per pt, a portion of the appendix was not removed  Stented coronary artery: 11/14/2014 - NAI to CX      Allergies    No Known Allergies    Intolerances    oxycodone (Other)  OxyContin (Other)      MEDICATIONS  (STANDING):  cefoTEtan  IVPB 2 Gram(s) IV Intermittent once    MEDICATIONS  (PRN):      CONSTITUTIONAL: No fever,  50lbweight loss, fatigue  EYES: No eye pain, visual disturbances, or discharge  ENMT:  No difficulty hearing, tinnitus, vertigo; No sinus or throat pain  NECK: No pain or stiffness  BREASTS: No pain, masses, or nipple discharge  RESPIRATORY: No cough, wheezing, chills or hemoptysis; No shortness of breath  CARDIOVASCULAR: No chest pain, palpitations, dizziness, or leg swelling  GASTROINTESTINAL:abdominal pain distension no bms GENITOURINARY: No dysuria, frequency, hematuria, or incontinence  NEUROLOGICAL: No headaches, memory loss, loss of strength, numbness, or tremors  SKIN: No itching, burning, rashes, or lesions   LYMPH NODES: No enlarged glands  ENDOCRINE: No heat or cold intolerance; No hair loss  MUSCULOSKELETAL: No joint pain or swelling; No muscle, back, or extremity pain  PSYCHIATRIC: No depression, anxiety, mood swings, or difficulty sleeping  HEME/LYMPH: No easy bruising, or bleeding gums  ALLERY AND IMMUNOLOGIC: No hives or eczema      PHYSICAL EXAM:    GENERAL: Comfortable, no acute distress   bp 118/90 pulse 88 occ irreg beats    HEAD:  Normocephalic, atraumatic  EYES: EOMI, PERRLA  HEENT: Moist mucous membranes  NECK: Supple, No JVD  NERVOUS SYSTEM:  Alert & Oriented X3, Motor Strength 5/5 B/L upper and lower extremities  CHEST/LUNG: Clear to auscultation bilaterally  HEART: Regular rate and rhythm  ABDOMEN distended dressed illeostomy  stent with drainage   rectum GENITOURINARY: indwelling tony   EXTREMITIES:   No clubbing, cyanosis, or edema  MUSCULOSKELTAL- No muscle tenderness, no joint tenderness  SKIN-no rash   of stages one and two (with  eGFR > 59 ml/min/m2) requires estimation of kidney damage for at least 3  months as defined by structural or functional abnormalities.  Limitations: All estimates of GFR will be less accurate for patients at  extremes of muscle mass (including but not limited to frail elderly,  critically ill, or cancer patients), those with unusual diets, and those  with conditions associated with reduced secretion or extrarenal  elimination of creatinine. The eGFR equation is not recommended for use  in patients with unstable creatinine levels. mL/min/1.73M2    eGFR if African American: 94 mL/min/1.73M2  Basic Metabolic Panel - STAT (04.28.19 @ 07:42)    Sodium, Serum: 138 mmol/L    Potassium, Serum: 3.8 mmol/L    Chloride, Serum: 104 mmol/L    Carbon Dioxide, Serum: 22 mmol/L    Anion Gap, Serum: 12 mmol/L    Blood Urea Nitrogen, Serum: 9 mg/dL    Creatinine, Serum: 0.83 mg/dL    Glucose, Serum: 91 mg/dL    Calcium, Total Serum: 9.0 mg/dL    eGFR if Non : 81: Interpretative comment  The units for eGFR are mL/min/1.73M2 (normalized body surface area). The  eGFR is calculated from a serum creatinine using the CKD-EPI equation.  Other variables required for calculation are race, age and sex. Among  patients with chronic kidney disease (CKD), the eGFR is useful in  determining the stage of disease according to KDOQI CKD classification.  All eGFR results are reported numerically with the following  interpretation.          GFR                    With                 Without     (ml/min/1.73 m2)    Kidney Damage       Kidney Damage        >= 90                    Stage 1                     Normal        60-89                    Stage 2                     Decreased GFR        30-59     Stage 3                     Stage 3        15-29                    Stage 4                     Stage 4        < 15                      Stage 5                     Stage 5  Each stage of CKD assumes that the associated GFR level has been in  effect for at least 3 months. Determination of stages one and two (with  eGFR > 59 ml/min/m2) requires estimation of kidney damage for at least 3  months as defined by structural or functional abnormalities.  Limitations: All estimates of GFR will be less accurate for patients at  extremes of muscle mass (including but not limited to frail elderly,  critically ill, or cancer patients), those with unusual diets, and those  with conditions associated with reduced secretion or extrarenal  elimination of creatinine. The eGFR equation is not recommended for use  in patients with unstable creatinine levels. mL/min/1.73M2    eGFR if African American: 94 mL/min/1.73M2    Complete Blood Count (04.28.19 @ 07:42)    WBC Count: 6.4 K/uL    RBC Count: 2.96 M/uL    Hemoglobin: 9.2 g/dL    Hematocrit: 27.5 %    Mean Cell Volume: 92.8 fl    Mean Cell Hemoglobin: 30.9 pg    Mean Cell Hemoglobin Conc: 33.3 gm/dL    Red Cell Distrib Width: 12.6 %    Platelet Count - Automated: 235 K/uL                                        EKG:< from: 12 Lead ECG (04.25.19 @ 08:34) >  Ventricular Rate 128 BPM    Atrial Rate 128 BPM    P-R Interval 178 ms    QRS Duration 80 ms    Q-T Interval 278 ms    QTC Calculation(Bezet) 405 ms    P Axis 51 degrees    R Axis -6 degrees    T Axis 6 degrees    Diagnosis Line SINUS TACHYCARDIA WITH OCCASIONAL PREMATURE VENTRICULAR COMPLEXES  NONSPECIFIC T WAVE ABNORMALITY  ABNORMAL ECG          Imaging Studies: obstructing mass on virtual colonoscopy and colonscopy       Impression / Plan:

## 2019-04-29 NOTE — PROGRESS NOTE ADULT - PROBLEM SELECTOR PLAN 1
day 6 post op colon resection illeostomy stent placement dvt prophylaxis surg protocol followed by gi for stent check next week discharge resume cancer treatment next week

## 2019-04-29 NOTE — DISCHARGE NOTE NURSING/CASE MANAGEMENT/SOCIAL WORK - NSSCTYPOFSERV_GEN_ALL_CORE
SOC   4/30/19   RN   Fede,   reinforcement of  ostomy care  &   reinforcement of  lovenox  injection.    Agency will contact you  to set  up  visit.  Feel free to contact Contact agency  to same

## 2019-04-30 PROBLEM — E78.5 HYPERLIPIDEMIA, UNSPECIFIED: Chronic | Status: ACTIVE | Noted: 2019-04-19

## 2019-04-30 PROBLEM — I10 ESSENTIAL (PRIMARY) HYPERTENSION: Chronic | Status: ACTIVE | Noted: 2019-04-19

## 2019-04-30 PROBLEM — I35.0 NONRHEUMATIC AORTIC (VALVE) STENOSIS: Chronic | Status: ACTIVE | Noted: 2019-04-19

## 2019-04-30 PROBLEM — I25.10 ATHEROSCLEROTIC HEART DISEASE OF NATIVE CORONARY ARTERY WITHOUT ANGINA PECTORIS: Chronic | Status: ACTIVE | Noted: 2019-04-19

## 2019-05-08 ENCOUNTER — APPOINTMENT (OUTPATIENT)
Dept: COLORECTAL SURGERY | Facility: CLINIC | Age: 84
End: 2019-05-08
Payer: MEDICARE

## 2019-05-08 PROCEDURE — 99024 POSTOP FOLLOW-UP VISIT: CPT

## 2019-05-22 NOTE — HISTORY OF PRESENT ILLNESS
[FreeTextEntry1] : Patient is an 83-year-old white gentleman who presents for his first postoperative visit.\par \par The patient was taken to the operating room for suspected late recurrence of a colorectal carcinoma. This was proven at the operative field but regretfully the lesion was unresectable. This recurrence was involving the bladder, small bowel and cecum. There was an additional retroperitoneal mass on the right  abutting the lateral aspect of the cecum.  A diverting ileostomy was constructed. The patient had no problems postoperatively.\par \par Patient looks thin and is weak. His appetite is starting to return. He has lost over 30 pounds in the last few months.\par \par His abdomen is soft, nontender and nondistended. His midline wound is healing nicely with no evidence of infection and all surgical staples removed.  A left lower quadrant loop ileostomy was constructed due to the right sided retroperitoneal recurrence. The stoma is pink, viable and functioning.\par \par Patient and his wife were  once again informed of the intraoperative findings. He is to follow-up with his oncologist tomorrow and hopefully he will treat him with chemotherapy.\par \par I will see him in one month.

## 2019-06-05 ENCOUNTER — APPOINTMENT (OUTPATIENT)
Dept: COLORECTAL SURGERY | Facility: CLINIC | Age: 84
End: 2019-06-05

## 2020-07-02 NOTE — PATIENT PROFILE ADULT - BRADEN NUTRITION
Micheal Goddard IV was admitted to [facility] 3 from clinic via wheelchair accompanied by significant other.   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   Upon arrival, patient is stable. 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  Patient oriented to bed, call light, , room and unit.  Patient provided with the following educational materials upon admission:safety, advanced directives, infection control and pain.   Level of understanding patient verbalized understanding.   Admission orders not received at this time.    notified of patient arrival.   See Epic documentation for patient individualized nursing care plan.   (3) adequate
